# Patient Record
Sex: FEMALE | Race: WHITE | Employment: FULL TIME | URBAN - METROPOLITAN AREA
[De-identification: names, ages, dates, MRNs, and addresses within clinical notes are randomized per-mention and may not be internally consistent; named-entity substitution may affect disease eponyms.]

---

## 2019-09-27 ENCOUNTER — APPOINTMENT (OUTPATIENT)
Dept: CT IMAGING | Age: 49
End: 2019-09-27
Payer: OTHER MISCELLANEOUS

## 2019-09-27 ENCOUNTER — APPOINTMENT (OUTPATIENT)
Dept: GENERAL RADIOLOGY | Age: 49
End: 2019-09-27
Payer: OTHER MISCELLANEOUS

## 2019-09-27 ENCOUNTER — HOSPITAL ENCOUNTER (EMERGENCY)
Age: 49
Discharge: HOME | End: 2019-09-27
Payer: OTHER MISCELLANEOUS

## 2019-09-27 VITALS
RESPIRATION RATE: 18 BRPM | WEIGHT: 165 LBS | DIASTOLIC BLOOD PRESSURE: 77 MMHG | OXYGEN SATURATION: 98 % | SYSTOLIC BLOOD PRESSURE: 132 MMHG | TEMPERATURE: 98 F | HEART RATE: 78 BPM
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
## 2019-09-27 DIAGNOSIS — S16.1XXA ACUTE STRAIN OF NECK MUSCLE, INITIAL ENCOUNTER: ICD-10-CM

## 2019-09-27 DIAGNOSIS — M25.511 ACUTE PAIN OF RIGHT SHOULDER: ICD-10-CM

## 2019-09-27 PROCEDURE — 99284 EMERGENCY DEPT VISIT MOD MDM: CPT

## 2019-09-27 PROCEDURE — 72125 CT NECK SPINE W/O DYE: CPT

## 2019-09-27 PROCEDURE — 73030 X-RAY EXAM OF SHOULDER: CPT

## 2019-09-27 RX ORDER — NAPROXEN 500 MG/1
500 TABLET ORAL 2 TIMES DAILY
Qty: 20 TABLET | Refills: 0 | Status: SHIPPED | OUTPATIENT
Start: 2019-09-27 | End: 2020-02-17

## 2019-09-27 RX ORDER — CYCLOBENZAPRINE HCL 10 MG
10 TABLET ORAL 3 TIMES DAILY PRN
Qty: 20 TABLET | Refills: 0 | Status: SHIPPED | OUTPATIENT
Start: 2019-09-27 | End: 2019-10-04

## 2019-09-27 ASSESSMENT — PAIN SCALES - GENERAL: PAINLEVEL_OUTOF10: 8

## 2019-09-27 ASSESSMENT — ENCOUNTER SYMPTOMS: GASTROINTESTINAL NEGATIVE: 1
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
## 2019-12-05 ENCOUNTER — HOSPITAL ENCOUNTER (OUTPATIENT)
Dept: MRI IMAGING | Age: 49
Discharge: HOME | End: 2019-12-05
Payer: OTHER GOVERNMENT

## 2019-12-05 DIAGNOSIS — M62.830 BACK MUSCLE SPASM: ICD-10-CM

## 2019-12-05 DIAGNOSIS — M99.01 CERVICAL (NECK) REGION SOMATIC DYSFUNCTION: ICD-10-CM

## 2019-12-05 DIAGNOSIS — M50.13 CERVICAL DISC DISORDER WITH RADICULOPATHY OF CERVICOTHORACIC REGION: ICD-10-CM

## 2019-12-05 PROCEDURE — 72141 MRI NECK SPINE W/O DYE: CPT

## 2020-01-31 ENCOUNTER — HOSPITAL ENCOUNTER (OUTPATIENT)
Dept: GENERAL RADIOLOGY | Age: 50
Discharge: HOME | End: 2020-01-31
Payer: OTHER GOVERNMENT

## 2020-01-31 ENCOUNTER — HOSPITAL ENCOUNTER (OUTPATIENT)
Age: 50
Discharge: HOME | End: 2020-01-31
Payer: OTHER GOVERNMENT

## 2020-01-31 PROCEDURE — 72052 X-RAY EXAM NECK SPINE 6/>VWS: CPT

## 2020-02-17 ENCOUNTER — HOSPITAL ENCOUNTER (EMERGENCY)
Age: 50
Discharge: HOME | End: 2020-02-17
Payer: OTHER GOVERNMENT

## 2020-02-17 VITALS
TEMPERATURE: 97.2 F | OXYGEN SATURATION: 97 % | RESPIRATION RATE: 18 BRPM | WEIGHT: 166 LBS | HEART RATE: 75 BPM | BODY MASS INDEX: 25.16 KG/M2 | HEIGHT: 68 IN | DIASTOLIC BLOOD PRESSURE: 67 MMHG | SYSTOLIC BLOOD PRESSURE: 119 MMHG

## 2020-02-17 PROCEDURE — 6370000000 HC RX 637 (ALT 250 FOR IP): Performed by: NURSE PRACTITIONER

## 2020-02-17 PROCEDURE — 99282 EMERGENCY DEPT VISIT SF MDM: CPT

## 2020-02-17 RX ORDER — CHLORHEXIDINE GLUCONATE 0.12 MG/ML
15 RINSE ORAL 2 TIMES DAILY
Qty: 420 ML | Refills: 0 | Status: SHIPPED | OUTPATIENT
Start: 2020-02-17 | End: 2020-03-02

## 2020-02-17 RX ORDER — CLINDAMYCIN HYDROCHLORIDE 150 MG/1
450 CAPSULE ORAL 3 TIMES DAILY
Qty: 90 CAPSULE | Refills: 0 | Status: SHIPPED | OUTPATIENT
Start: 2020-02-17 | End: 2020-02-27

## 2020-02-17 RX ORDER — NAPROXEN 500 MG/1
500 TABLET ORAL 2 TIMES DAILY
Qty: 20 TABLET | Refills: 0 | Status: SHIPPED | OUTPATIENT
Start: 2020-02-17 | End: 2020-10-14 | Stop reason: ALTCHOICE

## 2020-02-17 RX ORDER — NAPROXEN 250 MG/1
250 TABLET ORAL ONCE
Status: DISCONTINUED | OUTPATIENT
Start: 2020-02-17 | End: 2020-02-17 | Stop reason: HOSPADM

## 2020-02-17 RX ORDER — CLINDAMYCIN HYDROCHLORIDE 150 MG/1
450 CAPSULE ORAL ONCE
Status: COMPLETED | OUTPATIENT
Start: 2020-02-17 | End: 2020-02-17

## 2020-02-17 RX ORDER — CHLORAL HYDRATE 500 MG
3000 CAPSULE ORAL 3 TIMES DAILY
COMMUNITY
End: 2022-06-09

## 2020-02-17 RX ADMIN — CLINDAMYCIN HYDROCHLORIDE 300 MG: 150 CAPSULE ORAL at 15:01

## 2020-02-17 ASSESSMENT — ENCOUNTER SYMPTOMS
RHINORRHEA: 0
COLOR CHANGE: 0
SINUS PRESSURE: 1
SINUS PAIN: 1
ABDOMINAL PAIN: 0
SORE THROAT: 0
SHORTNESS OF BREATH: 0

## 2020-02-17 ASSESSMENT — PAIN SCALES - GENERAL: PAINLEVEL_OUTOF10: 7

## 2020-02-17 ASSESSMENT — PAIN DESCRIPTION - PAIN TYPE: TYPE: ACUTE PAIN

## 2020-02-17 NOTE — ED PROVIDER NOTES
Evaluated by Advanced Practice Provider          [facility] ED  EMERGENCY DEPARTMENT ENCOUNTER        Pt Name: Noah Smith  MRN: [id number]  [place] 1970  Dateof evaluation: 2/17/2020  Provider: JEISON Gutierrez - CHANTELL  PCP: Aminta You MD  ED Attending: No att. providers found    [facility]       Chief Complaint   Patient presents with    Dental Pain     pt sts she has a tooth that is loose. sts she thinks she has an infection either from the tooth or sinus. HISTORY OF PRESENTILLNESS   (Location/Symptom, Timing/Onset, Context/Setting, Quality, Duration, Modifying Factors, Severity)  Note limiting factors. Noah Smith is a 51 y.o. female for facial pressure and tooth pain. Onset was a few days. Duration has been since the onset. Context includes pt states she has facial pressure and tooth pain. Alleviating factors include nothing. Aggravating factors include nothing. Pain is 7/10. nothing has been used for pain today. Nursing Notes were all reviewed and agreed with or any disagreements were addressed  in the HPI. REVIEW OF SYSTEMS    (2-9 systems for level 4, 10 or more for level 5)     Review of Systems   Constitutional: Negative for fever. HENT: Positive for dental problem, sinus pressure and sinus pain. Negative for congestion, rhinorrhea and sore throat. Respiratory: Negative for shortness of breath. Cardiovascular: Negative for chest pain. Gastrointestinal: Negative for abdominal pain. Genitourinary: Negative for decreased urine volume and difficulty urinating. Musculoskeletal: Negative for arthralgias and myalgias. Skin: Negative for color change and rash. Neurological: Negative for dizziness and light-headedness. Psychiatric/Behavioral: Negative for agitation. All other systems reviewed and are negative. Positives and Pertinent negatives as per HPI.   Except as noted above in the ROS, all other systems were reviewed and negative. PAST MEDICAL HISTORY     Past Medical History:   Diagnosis Date    COPD (chronic obstructive pulmonary disease) ([facility] [address]          SURGICAL HISTORY       Past Surgical History:   Procedure Laterality Date    TUBAL LIGATION           CURRENT MEDICATIONS       Previous Medications    OMEGA-3 FATTY ACIDS (FISH OIL) 1000 MG CAPS    Take 3,000 mg by mouth 3 times daily         ALLERGIES     Levaquin [levofloxacin]; Oxycodone; Pcn [penicillins]; and Vicodin [hydrocodone-acetaminophen]    FAMILY HISTORY     History reviewed. No pertinent family history.        SOCIAL HISTORY       Social History     Socioeconomic History    Marital status:      Spouse name: None    Number of children: None    Years of education: None    Highest education level: None   Occupational History    None   Social Needs    Financial resource strain: None    Food insecurity:     Worry: None     Inability: None    Transportation needs:     Medical: None     Non-medical: None   Tobacco Use    Smoking status: Current Every Day Smoker   Substance and Sexual Activity    Alcohol use: None    Drug use: Not Currently    Sexual activity: None   Lifestyle    Physical activity:     Days per week: None     Minutes per session: None    Stress: None   Relationships    Social connections:     Talks on phone: None     Gets together: None     Attends Yarsani service: None     Active member of club or organization: None     Attends meetings of clubs or organizations: None     Relationship status: None    Intimate partner violence:     Fear of current or ex partner: None     Emotionally abused: None     Physically abused: None     Forced sexual activity: None   Other Topics Concern    None   Social History Narrative    None       SCREENINGS             PHYSICAL EXAM  (up to 7 for level 4, 8 or more for level 5)     ED Triage Vitals [02/17/20 1425]   BP Temp Temp Source Pulse Resp SpO2 Height Weight   119/67 97.2 °F Patient here for complaints of dental pain and sinus pressure. Patient is not sure which one is aggravating the other however she does exhibit sinus pressure that she has had for the last 3 to 4 days. She also complains of a loose tooth that is become painful. Patient reports that she has not been taking any medications for. She does smoke. On exam she is awake and alert hemodynamically stable nontoxic in appearance. Patient has no concerns for an abscess or Nicholas's. She will be treated with clindamycin due to her allergies for her tooth. She was given instructions to follow-up with her primary care doctor in the next few days and return to the ED for worsening symptoms. She was discharged home with clindamycin and Naprosyn and Peridex. She was provided with a dental referral.  Patient was on ultimately discharged home with all questions answered. ED nurse attempted to medicate the patient with 450 mg of clindamycin however the patient would only take 300 mg. Patient was provided with the appropriate prescriptions and given the appropriate discharge instructions. The patient tolerated their visit well. I have evaluated thispatient. My supervising physician was available for consultation. The patient and / or the family were informed of the results of any tests, a time was given to answer questions, a plan was proposed and they agreed Eyal Saas. FINAL IMPRESSION      1. Pain, dental    2. Acute non-recurrent frontal sinusitis    3.  Smoker          DISPOSITION/PLAN   DISPOSITION Discharge - Pending Orders Complete 02/17/2020 02:56:29 PM      PATIENT REFERRED TO:  Oswald Rodrigez MD  [address], [facility]  [phone]    Schedule an appointment as soon as possible for a visit in 2 days  for re-evaluation    [facility]  [address] [facility]  [phone]    If symptoms worsen    see the list      to establish dental

## 2020-02-21 ENCOUNTER — OFFICE VISIT (OUTPATIENT)
Dept: INTERNAL MEDICINE CLINIC | Age: 50
End: 2020-02-21

## 2020-02-21 VITALS
DIASTOLIC BLOOD PRESSURE: 96 MMHG | WEIGHT: 176 LBS | OXYGEN SATURATION: 96 % | TEMPERATURE: 98.2 F | SYSTOLIC BLOOD PRESSURE: 134 MMHG | RESPIRATION RATE: 18 BRPM | HEIGHT: 68 IN | HEART RATE: 90 BPM | BODY MASS INDEX: 26.67 KG/M2

## 2020-02-21 PROCEDURE — 99203 OFFICE O/P NEW LOW 30 MIN: CPT | Performed by: PHYSICIAN ASSISTANT

## 2020-02-21 RX ORDER — AZITHROMYCIN 250 MG/1
250 TABLET, FILM COATED ORAL SEE ADMIN INSTRUCTIONS
Qty: 6 TABLET | Refills: 0 | Status: SHIPPED | OUTPATIENT
Start: 2020-02-21 | End: 2020-02-26

## 2020-02-21 NOTE — PATIENT INSTRUCTIONS
- Try taking the allergy medications to help with your symptoms   - Call for any worsening symptoms   - Follow up with a dentist

## 2020-02-21 NOTE — PROGRESS NOTES
[hydrocodone-acetaminophen]; Zyrtec [cetirizine]; and Keflex [cephalexin]      Vitals  BP (!) 134/96 (Site: Right Upper Arm, Position: Sitting)   Pulse 90   Temp 98.2 °F (36.8 °C)   Resp 18   Ht 5' 8\" (1.727 m)   Wt 176 lb (79.8 kg)   SpO2 96%   BMI 26.76 kg/m²     Current Medications  Current Outpatient Medications   Medication Sig Dispense Refill    Omega-3 Fatty Acids (FISH OIL) 1000 MG CAPS Take 3,000 mg by mouth 3 times daily      naproxen (NAPROSYN) 500 MG tablet Take 1 tablet by mouth 2 times daily for 20 doses 20 tablet 0    chlorhexidine (PERIDEX) 0.12 % solution Take 15 mLs by mouth 2 times daily for 14 days 420 mL 0    clindamycin (CLEOCIN) 150 MG capsule Take 3 capsules by mouth 3 times daily for 10 days (Patient not taking: Reported on 2/21/2020) 90 capsule 0     No current facility-administered medications for this visit.         Past Medical History  Past Medical History:   Diagnosis Date    COPD (chronic obstructive pulmonary disease) ([facility] [address]        Social History  Social History     Socioeconomic History    Marital status:      Spouse name: Not on file    Number of children: Not on file    Years of education: Not on file    Highest education level: Not on file   Occupational History    Not on file   Social Needs    Financial resource strain: Not on file    Food insecurity:     Worry: Not on file     Inability: Not on file    Transportation needs:     Medical: Not on file     Non-medical: Not on file   Tobacco Use    Smoking status: Current Every Day Smoker   Substance and Sexual Activity    Alcohol use: Not on file    Drug use: Not Currently    Sexual activity: Not on file   Lifestyle    Physical activity:     Days per week: Not on file     Minutes per session: Not on file    Stress: Not on file   Relationships    Social connections:     Talks on phone: Not on file     Gets together: Not on file     Attends Protestant service: Not on file     Active member of club or Cervical: No cervical adenopathy. Skin:     General: Skin is warm and dry. Capillary Refill: Capillary refill takes less than 2 seconds. Neurological:      General: No focal deficit present. Mental Status: She is alert and oriented to person, place, and time. Psychiatric:         Mood and Affect: Mood normal.         Thought Content: Thought content normal.       Assessment/Plan    1. Acute non-recurrent maxillary sinusitis  -Patient was started on Cipro in the ED to cover for oral amee given the loose tooth but had an allergic reaction  -Still having sinus pressure and pain but has multiple allergies, will start Zithromax as needed however patient wished to trial some nasal decongestants prior to starting the antibiotics which I believe is an appropriate choice  -There is no significant periodontic infection at this time, patient will continue Peridex swish and spit and follow-up with a dental clinic  - azithromycin (ZITHROMAX) 250 MG tablet; Take 1 tablet by mouth See Admin Instructions for 5 days 500mg on day 1 followed by 250mg on days 2 - 5  Dispense: 6 tablet; Refill: 0      This was the patient's initial visit to the clinic however given a short appointment time and acute issues, we are unable to go over all the typical new patient information and history. Recommend the patient come back in about a month's time to establish with 1 of the attending physicians in the clinic where they can discuss further detail regarding the patient's prior medical history, home medications, prior preventative health maintenance screens etc.    Return in about 1 month (around 3/21/2020).

## 2020-02-21 NOTE — PROGRESS NOTES
mouth 2 times daily for 14 days 420 mL 0    clindamycin (CLEOCIN) 150 MG capsule Take 3 capsules by mouth 3 times daily for 10 days (Patient not taking: Reported on 2/21/2020) 90 capsule 0     No current facility-administered medications for this visit. Past Medical History  Past Medical History:   Diagnosis Date    COPD (chronic obstructive pulmonary disease) ([facility] [address]        Social History  Social History     Socioeconomic History    Marital status:      Spouse name: Not on file    Number of children: Not on file    Years of education: Not on file    Highest education level: Not on file   Occupational History    Not on file   Social Needs    Financial resource strain: Not on file    Food insecurity:     Worry: Not on file     Inability: Not on file    Transportation needs:     Medical: Not on file     Non-medical: Not on file   Tobacco Use    Smoking status: Current Every Day Smoker   Substance and Sexual Activity    Alcohol use: Not on file    Drug use: Not Currently    Sexual activity: Not on file   Lifestyle    Physical activity:     Days per week: Not on file     Minutes per session: Not on file    Stress: Not on file   Relationships    Social connections:     Talks on phone: Not on file     Gets together: Not on file     Attends Anabaptist service: Not on file     Active member of club or organization: Not on file     Attends meetings of clubs or organizations: Not on file     Relationship status: Not on file    Intimate partner violence:     Fear of current or ex partner: Not on file     Emotionally abused: Not on file     Physically abused: Not on file     Forced sexual activity: Not on file   Other Topics Concern    Not on file   Social History Narrative    Not on file       SurgicalHistory  Past Surgical History:   Procedure Laterality Date    TUBAL LIGATION         Physical Exam  Physical Exam  Constitutional:       Appearance: Normal appearance.    HENT:      Head: Normocephalic and atraumatic. Right Ear: Tympanic membrane, ear canal and external ear normal. There is no impacted cerumen. Left Ear: Tympanic membrane, ear canal and external ear normal. There is no impacted cerumen. Nose: Congestion present. Mouth/Throat:      Mouth: Mucous membranes are moist.   Eyes:      Conjunctiva/sclera: Conjunctivae normal.   Neck:      Musculoskeletal: Normal range of motion. Cardiovascular:      Rate and Rhythm: Normal rate and regular rhythm. Pulses: Normal pulses. Pulmonary:      Effort: Pulmonary effort is normal.      Breath sounds: Normal breath sounds. No wheezing. Chest:      Chest wall: No tenderness. Abdominal:      General: Bowel sounds are normal.      Palpations: Abdomen is soft. Musculoskeletal: Normal range of motion. General: No swelling. Lymphadenopathy:      Cervical: No cervical adenopathy. Skin:     General: Skin is warm and dry. Capillary Refill: Capillary refill takes less than 2 seconds. Neurological:      General: No focal deficit present. Mental Status: She is alert and oriented to person, place, and time. Psychiatric:         Mood and Affect: Mood normal.         Thought Content: Thought content normal.           Assessment    1. Acute non-recurrent maxillary sinusitis  ***  - azithromycin (ZITHROMAX) 250 MG tablet; Take 1 tablet by mouth See Admin Instructions for 5 days 500mg on day 1 followed by 250mg on days 2 - 5  Dispense: 6 tablet; Refill: 0        Plan    No orders of the defined types were placed in this encounter. Return in about 1 month (around 3/21/2020).

## 2020-02-27 ASSESSMENT — ENCOUNTER SYMPTOMS
RHINORRHEA: 0
SHORTNESS OF BREATH: 0
VOICE CHANGE: 1
COUGH: 0
GASTROINTESTINAL NEGATIVE: 1
SORE THROAT: 0
SINUS PRESSURE: 1
SINUS PAIN: 0

## 2020-07-17 ENCOUNTER — OFFICE VISIT (OUTPATIENT)
Dept: INTERNAL MEDICINE CLINIC | Age: 50
End: 2020-07-17

## 2020-07-17 VITALS
DIASTOLIC BLOOD PRESSURE: 80 MMHG | HEIGHT: 68 IN | BODY MASS INDEX: 26.98 KG/M2 | RESPIRATION RATE: 12 BRPM | SYSTOLIC BLOOD PRESSURE: 128 MMHG | WEIGHT: 178 LBS | HEART RATE: 70 BPM

## 2020-07-17 PROBLEM — J44.9 CHRONIC OBSTRUCTIVE PULMONARY DISEASE (HCC): Status: ACTIVE | Noted: 2020-07-17

## 2020-07-17 PROCEDURE — 99203 OFFICE O/P NEW LOW 30 MIN: CPT | Performed by: INTERNAL MEDICINE

## 2020-07-17 RX ORDER — ALBUTEROL SULFATE 2.5 MG/3ML
2.5 SOLUTION RESPIRATORY (INHALATION) EVERY 6 HOURS PRN
COMMUNITY
End: 2020-07-17 | Stop reason: SDUPTHER

## 2020-07-17 RX ORDER — ALBUTEROL SULFATE 2.5 MG/3ML
2.5 SOLUTION RESPIRATORY (INHALATION) EVERY 6 HOURS PRN
Qty: 120 EACH | Refills: 2 | Status: SHIPPED | OUTPATIENT
Start: 2020-07-17

## 2020-07-17 ASSESSMENT — ENCOUNTER SYMPTOMS
SHORTNESS OF BREATH: 0
WHEEZING: 0
VOMITING: 0
NAUSEA: 0
ABDOMINAL PAIN: 0
RHINORRHEA: 0

## 2020-07-17 NOTE — PROGRESS NOTES
Subjective:      Patient ID: Dane Lee is a 53 y.o. female. HPI     Patient is here for follow up. She has a history of COPD. She is a smoker. She was diagnosed two years ago. She has never had PFT done. She has chronic hoarseness since she was a teenager. She denies any chest pain. She denies any GI issues. Review of Systems   Constitutional: Negative for appetite change and fatigue. HENT: Negative for postnasal drip and rhinorrhea. Respiratory: Negative for shortness of breath and wheezing. Cardiovascular: Negative for chest pain and palpitations. Gastrointestinal: Negative for abdominal pain, nausea and vomiting. Musculoskeletal: Negative for joint swelling. Skin: Negative for rash. Neurological: Negative for light-headedness. Psychiatric/Behavioral: Negative for sleep disturbance. Past Medical History:   Diagnosis Date    COPD (chronic obstructive pulmonary disease) ([facility] [address]        Past Surgical History:   Procedure Laterality Date    TUBAL LIGATION         History reviewed. No pertinent family history. Social History     Tobacco Use    Smoking status: Current Every Day Smoker     Packs/day: 0.50    Smokeless tobacco: Never Used   Substance Use Topics    Alcohol use: Yes     Comment: rare    Drug use: Not Currently       /80 (Site: Right Upper Arm, Position: Sitting, Cuff Size: Medium Adult)   Pulse 70   Resp 12   Ht 5' 8\" (1.727 m)   Wt 178 lb (80.7 kg)   BMI 27.06 kg/m²       Objective:   Physical Exam  Constitutional:       Appearance: She is well-developed. HENT:      Head: Normocephalic. Eyes:      Conjunctiva/sclera: Conjunctivae normal.      Pupils: Pupils are equal, round, and reactive to light. Neck:      Musculoskeletal: Normal range of motion and neck supple. Thyroid: No thyroid mass or thyromegaly. Vascular: No carotid bruit or JVD.       Trachea: Trachea normal.      Comments: ?soft tissue mass right upper neck  Cardiovascular: Rate and Rhythm: Normal rate and regular rhythm. Heart sounds: Normal heart sounds. No murmur. No gallop. Pulmonary:      Effort: Pulmonary effort is normal. No respiratory distress. Breath sounds: Normal breath sounds. No wheezing or rales. Chest:      Comments: Mass left lower chest/upper abdomen  Abdominal:      General: Bowel sounds are normal. There is no distension. Palpations: Abdomen is soft. There is no hepatomegaly, splenomegaly or mass. Tenderness: There is no abdominal tenderness. Musculoskeletal: Normal range of motion. Lymphadenopathy:      Cervical: No cervical adenopathy. Skin:     General: Skin is warm and dry. Findings: No rash. Neurological:      Mental Status: She is alert and oriented to person, place, and time. Cranial Nerves: No cranial nerve deficit. Deep Tendon Reflexes: Reflexes are normal and symmetric. Psychiatric:         Behavior: Behavior normal.         Thought Content: Thought content normal.         Judgment: Judgment normal.         Assessment:       Diagnosis Orders   1. Chronic obstructive pulmonary disease, unspecified COPD type ([address]  Full PFT Study With Bronchodilator    Comprehensive Metabolic Panel    CBC Auto Differential    Lipid Panel    TSH without Reflex    Uric Acid   2. Mass of right side of neck     3. Mass of soft tissue of abdomen  US SOFT TISSUE LIMITED AREA          Plan:      #  COPD. Check PFT. #  ? Mass right upper neck. She has had it for many years. She has had a CT neck. She will get us a copy. #  Soft tissue mass. Check US.         Lauren Zafar MD

## 2020-07-31 DIAGNOSIS — J44.9 CHRONIC OBSTRUCTIVE PULMONARY DISEASE, UNSPECIFIED COPD TYPE (HCC): ICD-10-CM

## 2020-07-31 LAB
A/G RATIO: 1.3 (ref 1.1–2.2)
ALBUMIN SERPL-MCNC: 4 G/DL (ref 3.4–5)
ALP BLD-CCNC: 85 U/L (ref 40–129)
ALT SERPL-CCNC: 10 U/L (ref 10–40)
ANION GAP SERPL CALCULATED.3IONS-SCNC: 11 MMOL/L (ref 3–16)
AST SERPL-CCNC: 17 U/L (ref 15–37)
BASOPHILS ABSOLUTE: 0.1 K/UL (ref 0–0.2)
BASOPHILS RELATIVE PERCENT: 0.9 %
BILIRUB SERPL-MCNC: 0.3 MG/DL (ref 0–1)
BUN BLDV-MCNC: 11 MG/DL (ref 7–20)
CALCIUM SERPL-MCNC: 9.1 MG/DL (ref 8.3–10.6)
CHLORIDE BLD-SCNC: 102 MMOL/L (ref 99–110)
CHOLESTEROL, TOTAL: 197 MG/DL (ref 0–199)
CO2: 26 MMOL/L (ref 21–32)
CREAT SERPL-MCNC: 0.6 MG/DL (ref 0.6–1.1)
EOSINOPHILS ABSOLUTE: 0.2 K/UL (ref 0–0.6)
EOSINOPHILS RELATIVE PERCENT: 3.5 %
GFR AFRICAN AMERICAN: >60
GFR NON-AFRICAN AMERICAN: >60
GLOBULIN: 3.1 G/DL
GLUCOSE BLD-MCNC: 95 MG/DL (ref 70–99)
HCT VFR BLD CALC: 42.6 % (ref 36–48)
HDLC SERPL-MCNC: 41 MG/DL (ref 40–60)
HEMOGLOBIN: 14.4 G/DL (ref 12–16)
LDL CHOLESTEROL CALCULATED: 139 MG/DL
LYMPHOCYTES ABSOLUTE: 2 K/UL (ref 1–5.1)
LYMPHOCYTES RELATIVE PERCENT: 29.6 %
MCH RBC QN AUTO: 31.8 PG (ref 26–34)
MCHC RBC AUTO-ENTMCNC: 33.9 G/DL (ref 31–36)
MCV RBC AUTO: 93.8 FL (ref 80–100)
MONOCYTES ABSOLUTE: 0.6 K/UL (ref 0–1.3)
MONOCYTES RELATIVE PERCENT: 8.2 %
NEUTROPHILS ABSOLUTE: 3.9 K/UL (ref 1.7–7.7)
NEUTROPHILS RELATIVE PERCENT: 57.8 %
PDW BLD-RTO: 13 % (ref 12.4–15.4)
PLATELET # BLD: 181 K/UL (ref 135–450)
PMV BLD AUTO: 9.3 FL (ref 5–10.5)
POTASSIUM SERPL-SCNC: 4.4 MMOL/L (ref 3.5–5.1)
RBC # BLD: 4.54 M/UL (ref 4–5.2)
SODIUM BLD-SCNC: 139 MMOL/L (ref 136–145)
TOTAL PROTEIN: 7.1 G/DL (ref 6.4–8.2)
TRIGL SERPL-MCNC: 86 MG/DL (ref 0–150)
TSH SERPL DL<=0.05 MIU/L-ACNC: 3.29 UIU/ML (ref 0.27–4.2)
URIC ACID, SERUM: 3 MG/DL (ref 2.6–6)
VLDLC SERPL CALC-MCNC: 17 MG/DL
WBC # BLD: 6.8 K/UL (ref 4–11)

## 2020-08-03 ENCOUNTER — TELEPHONE (OUTPATIENT)
Dept: INTERNAL MEDICINE CLINIC | Age: 50
End: 2020-08-03

## 2020-08-03 NOTE — TELEPHONE ENCOUNTER
----- Message from Anette Mandel MD sent at 8/3/2020  3:57 PM EDT -----  Contact: [phone]  Call her pharmacy and send the right one in  ----- Message -----  From: Autumn Mehta  Sent: 8/3/2020   3:48 PM EDT  To: Anette Mandel MD    Pt stated that the wrong medication was called in for her inhaler. Pt states that she needs albuteral with bromide. Please advise.       [phone]    Pharmacy: [facility]    Future appt-10/14/2020  Past appt-7/31/2020      SE

## 2020-08-04 RX ORDER — IPRATROPIUM BROMIDE AND ALBUTEROL SULFATE 2.5; .5 MG/3ML; MG/3ML
1 SOLUTION RESPIRATORY (INHALATION) EVERY 4 HOURS PRN
Qty: 1620 ML | Refills: 0 | Status: SHIPPED | OUTPATIENT
Start: 2020-08-04 | End: 2020-11-02

## 2020-08-04 NOTE — TELEPHONE ENCOUNTER
----- Message from Josh Lucks sent at 8/3/2020  4:05 PM EDT -----  Contact: [phone]  Would this be atrovent in [facility]?   ----- Message -----  From: Dinah Lorenzo MD  Sent: 8/3/2020   3:57 PM EDT  To: Latoya Robbins    Call her pharmacy and send the right one in  ----- Message -----  From: Magdalena Mi  Sent: 8/3/2020   3:48 PM EDT  To: Dinah Lorenzo MD    Pt stated that the wrong medication was called in for her inhaler. Pt states that she needs albuteral with bromide. Please advise.       [phone]    Pharmacy: [facility]    Future appt-10/14/2020  Past appt-7/31/2020      SE

## 2020-08-25 ENCOUNTER — TELEPHONE (OUTPATIENT)
Dept: INTERNAL MEDICINE CLINIC | Age: 50
End: 2020-08-25

## 2020-10-14 ENCOUNTER — OFFICE VISIT (OUTPATIENT)
Dept: INTERNAL MEDICINE CLINIC | Age: 50
End: 2020-10-14

## 2020-10-14 VITALS
WEIGHT: 176 LBS | TEMPERATURE: 97.9 F | HEIGHT: 68 IN | HEART RATE: 70 BPM | RESPIRATION RATE: 12 BRPM | DIASTOLIC BLOOD PRESSURE: 80 MMHG | BODY MASS INDEX: 26.67 KG/M2 | SYSTOLIC BLOOD PRESSURE: 110 MMHG

## 2020-10-14 PROCEDURE — 99213 OFFICE O/P EST LOW 20 MIN: CPT | Performed by: INTERNAL MEDICINE

## 2020-10-14 RX ORDER — ALBUTEROL SULFATE 90 UG/1
2 AEROSOL, METERED RESPIRATORY (INHALATION) EVERY 6 HOURS PRN
Qty: 1 INHALER | Refills: 3 | Status: SHIPPED | OUTPATIENT
Start: 2020-10-14 | End: 2022-03-21 | Stop reason: SDUPTHER

## 2020-10-14 ASSESSMENT — ENCOUNTER SYMPTOMS
VOMITING: 0
ABDOMINAL PAIN: 0
RHINORRHEA: 0
SHORTNESS OF BREATH: 0
WHEEZING: 0
NAUSEA: 0

## 2020-10-14 ASSESSMENT — PATIENT HEALTH QUESTIONNAIRE - PHQ9
SUM OF ALL RESPONSES TO PHQ QUESTIONS 1-9: 0
SUM OF ALL RESPONSES TO PHQ9 QUESTIONS 1 & 2: 0
1. LITTLE INTEREST OR PLEASURE IN DOING THINGS: 0
SUM OF ALL RESPONSES TO PHQ QUESTIONS 1-9: 0
2. FEELING DOWN, DEPRESSED OR HOPELESS: 0

## 2020-10-14 NOTE — PROGRESS NOTES
Subjective:      Patient ID: Maria Dolores Seth is a 53 y.o. female. HPI     Patient is here for follow up. She has a history of COPD. She is a smoker. She was diagnosed two years ago. She has never had PFT done. They have been ordered but never done. She does have some wheezing. She needs a new neb as her old one is not working anymore. She has chronic hoarseness since she was a teenager. She denies any chest pain. She denies any GI issues. Review of Systems   Constitutional: Negative for appetite change and fatigue. HENT: Negative for postnasal drip and rhinorrhea. Respiratory: Negative for shortness of breath and wheezing. Cardiovascular: Negative for chest pain and palpitations. Gastrointestinal: Negative for abdominal pain, nausea and vomiting. Musculoskeletal: Negative for joint swelling. Skin: Negative for rash. Neurological: Negative for light-headedness. Psychiatric/Behavioral: Negative for sleep disturbance. Past Medical History:   Diagnosis Date    COPD (chronic obstructive pulmonary disease) ([facility] [address]        Past Surgical History:   Procedure Laterality Date    TUBAL LIGATION         History reviewed. No pertinent family history. Social History     Tobacco Use    Smoking status: Current Every Day Smoker     Packs/day: 0.50    Smokeless tobacco: Never Used   Substance Use Topics    Alcohol use: Yes     Comment: rare    Drug use: Not Currently       /80 (Site: Left Upper Arm, Position: Sitting, Cuff Size: Medium Adult)   Pulse 70   Temp 97.9 °F (36.6 °C)   Resp 12   Ht 5' 8\" (1.727 m)   Wt 176 lb (79.8 kg)   BMI 26.76 kg/m²       Objective:   Physical Exam  Constitutional:       Appearance: She is well-developed. HENT:      Head: Normocephalic. Eyes:      Conjunctiva/sclera: Conjunctivae normal.      Pupils: Pupils are equal, round, and reactive to light. Neck:      Musculoskeletal: Normal range of motion and neck supple.       Thyroid: No thyroid mass or thyromegaly. Vascular: No carotid bruit or JVD. Trachea: Trachea normal.      Comments: ?soft tissue mass right upper neck  Cardiovascular:      Rate and Rhythm: Normal rate and regular rhythm. Heart sounds: Normal heart sounds. No murmur. No gallop. Pulmonary:      Effort: Pulmonary effort is normal. No respiratory distress. Breath sounds: Normal breath sounds. No wheezing or rales. Chest:      Comments: Mass left lower chest/upper abdomen  Abdominal:      General: Bowel sounds are normal. There is no distension. Palpations: Abdomen is soft. There is no hepatomegaly, splenomegaly or mass. Tenderness: There is no abdominal tenderness. Musculoskeletal: Normal range of motion. Lymphadenopathy:      Cervical: No cervical adenopathy. Skin:     General: Skin is warm and dry. Findings: No rash. Neurological:      Mental Status: She is alert and oriented to person, place, and time. Cranial Nerves: No cranial nerve deficit. Deep Tendon Reflexes: Reflexes are normal and symmetric. Psychiatric:         Behavior: Behavior normal.         Thought Content: Thought content normal.         Judgment: Judgment normal.         Assessment:       Diagnosis Orders   1. Chronic obstructive pulmonary disease, unspecified COPD type ([address]  DME Order for Nebulizer as OP   2. Mass of right side of neck  CT SOFT TISSUE NECK W CONTRAST          Plan:      #  COPD. She has not done her PFT. #  ? Mass right upper neck. She has had it for many years. She has had a CT neck but has not provided us a copy. I will order one today. #  She will schedule US for the soft tissue mas. [facility]             Lady Gee MD

## 2020-10-26 ENCOUNTER — APPOINTMENT (OUTPATIENT)
Dept: GENERAL RADIOLOGY | Age: 50
End: 2020-10-26
Payer: OTHER GOVERNMENT

## 2020-10-26 ENCOUNTER — HOSPITAL ENCOUNTER (EMERGENCY)
Age: 50
Discharge: HOME | End: 2020-10-26
Payer: OTHER GOVERNMENT

## 2020-10-26 VITALS
SYSTOLIC BLOOD PRESSURE: 143 MMHG | WEIGHT: 175 LBS | HEART RATE: 73 BPM | RESPIRATION RATE: 18 BRPM | TEMPERATURE: 98.3 F | HEIGHT: 68 IN | BODY MASS INDEX: 26.52 KG/M2 | OXYGEN SATURATION: 99 % | DIASTOLIC BLOOD PRESSURE: 75 MMHG

## 2020-10-26 PROCEDURE — 99283 EMERGENCY DEPT VISIT LOW MDM: CPT

## 2020-10-26 PROCEDURE — U0002 COVID-19 LAB TEST NON-CDC: HCPCS

## 2020-10-26 PROCEDURE — 90471 IMMUNIZATION ADMIN: CPT | Performed by: PHYSICIAN ASSISTANT

## 2020-10-26 PROCEDURE — 6360000002 HC RX W HCPCS: Performed by: PHYSICIAN ASSISTANT

## 2020-10-26 PROCEDURE — 90715 TDAP VACCINE 7 YRS/> IM: CPT | Performed by: PHYSICIAN ASSISTANT

## 2020-10-26 PROCEDURE — 6370000000 HC RX 637 (ALT 250 FOR IP): Performed by: PHYSICIAN ASSISTANT

## 2020-10-26 PROCEDURE — 71045 X-RAY EXAM CHEST 1 VIEW: CPT

## 2020-10-26 PROCEDURE — U0003 INFECTIOUS AGENT DETECTION BY NUCLEIC ACID (DNA OR RNA); SEVERE ACUTE RESPIRATORY SYNDROME CORONAVIRUS 2 (SARS-COV-2) (CORONAVIRUS DISEASE [COVID-19]), AMPLIFIED PROBE TECHNIQUE, MAKING USE OF HIGH THROUGHPUT TECHNOLOGIES AS DESCRIBED BY CMS-2020-01-R: HCPCS

## 2020-10-26 RX ORDER — ERYTHROMYCIN 5 MG/G
OINTMENT OPHTHALMIC
Qty: 1 G | Refills: 0 | Status: SHIPPED | OUTPATIENT
Start: 2020-10-26 | End: 2022-06-09

## 2020-10-26 RX ORDER — ERYTHROMYCIN 5 MG/G
OINTMENT OPHTHALMIC ONCE
Status: COMPLETED | OUTPATIENT
Start: 2020-10-26 | End: 2020-10-26

## 2020-10-26 RX ORDER — AZITHROMYCIN 250 MG/1
250 TABLET, FILM COATED ORAL SEE ADMIN INSTRUCTIONS
Qty: 6 TABLET | Refills: 0 | Status: SHIPPED | OUTPATIENT
Start: 2020-10-26 | End: 2020-10-31

## 2020-10-26 RX ORDER — BENOXINATE HCL/FLUORESCEIN SOD 0.4%-0.25%
2 DROPS OPHTHALMIC (EYE) ONCE
Status: DISCONTINUED | OUTPATIENT
Start: 2020-10-26 | End: 2020-10-26 | Stop reason: ALTCHOICE

## 2020-10-26 RX ADMIN — TETANUS TOXOID, REDUCED DIPHTHERIA TOXOID AND ACELLULAR PERTUSSIS VACCINE, ADSORBED 0.5 ML: 5; 2.5; 8; 8; 2.5 SUSPENSION INTRAMUSCULAR at 21:33

## 2020-10-26 RX ADMIN — ERYTHROMYCIN: 5 OINTMENT OPHTHALMIC at 21:33

## 2020-10-26 ASSESSMENT — VISUAL ACUITY
OU: 20/20
OD: 20/20
OS: 20/20

## 2020-10-26 ASSESSMENT — PAIN SCALES - GENERAL: PAINLEVEL_OUTOF10: 4

## 2020-10-27 ENCOUNTER — CARE COORDINATION (OUTPATIENT)
Dept: CARE COORDINATION | Age: 50
End: 2020-10-27

## 2020-10-27 LAB — SARS-COV-2, PCR: NOT DETECTED

## 2020-10-27 ASSESSMENT — ENCOUNTER SYMPTOMS
NAUSEA: 0
RHINORRHEA: 0
EYE REDNESS: 0
SINUS PAIN: 0
ABDOMINAL PAIN: 0
VOMITING: 0
CONSTIPATION: 0
CHEST TIGHTNESS: 0
DIARRHEA: 0
SINUS PRESSURE: 0
EYE DISCHARGE: 0
SHORTNESS OF BREATH: 0
COUGH: 0
SORE THROAT: 0

## 2020-10-27 ASSESSMENT — VISUAL ACUITY: OU: 1

## 2020-10-27 NOTE — CARE COORDINATION
Patient contacted regarding Joelle Torres. Discussed COVID-19 related testing which was pending at this time. Test results were pending. Patient informed of results, if available? Test Results Pending. Care Transition Nurse/ Ambulatory Care Manager contacted the patient by telephone to perform post discharge assessment. Call within 2 business days of discharge: Yes. Verified name and  with patient as identifiers. Provided introduction to self, and explanation of the CTN/ACM role, and reason for call due to risk factors for infection and/or exposure to COVID-19. Symptoms reviewed with patient who verbalized the following symptoms: cough. Due to no new or worsening symptoms encounter was not routed to provider for escalation. Discussed follow-up appointments. If no appointment was previously scheduled, appointment scheduling offered: Yes  [facility] follow up appointment(s):   Future Appointments   Date Time Provider Lucy Sun   2021 11:00 AM MD [facility] None     Non-[facility] follow up appointment(s): n/a    Non-face-to-face services provided:  Pt to reach out to her PCP and her current eye doctor for f/u. Advance Care Planning:   Does patient have an Advance Directive:  reviewed and current. Patient has following risk factors of: COPD. CTN/ACM reviewed discharge instructions, medical action plan and red flags such as increased shortness of breath, increasing fever and signs of decompensation with patient who verbalized understanding. Discussed exposure protocols and quarantine with CDC Guidelines What to do if you are sick with coronavirus disease .  Patient was given an opportunity for questions and concerns. The patient agrees to contact the [facility] exposure line [phone], [facility] department [place] Department [facility] Health: ([phone]) and PCP office for questions related to their healthcare.  CTN/ACM provided contact information for future

## 2020-10-27 NOTE — ED PROVIDER NOTES
Evaluated by Advanced Practice Provider          [facility]  EMERGENCY DEPARTMENT ENCOUNTER      This patient was not seen and evaluated by the attending physician No att. providers found. I have independently evaluated this patient. Pt Name: Lanny Gamble  MRN: [id number]  Pérezgfrandolph 1970  Dateof evaluation: 10/26/2020  Provider: Mar Gurrola PA-C  PCP: Monika Schultz MD  [address]       Chief Complaint   Patient presents with    Eye Pain     right eye pain after being scratched in eye    Other     pt reports hx of COPD and problems with her sinuses and started a new job where she has to wear a mask all the time and is having worsening SOB while wearing the mask. pt reports some exposure to covid 19       HISTORY OF PRESENTILLNESS   (Location/Symptom, Timing/Onset, Context/Setting, Quality, Duration, Modifying Factors, Severity)  Note limiting factors. Lanny Gamble is a 53 y.o. female for evaluation via private vehicle for assessment of an injury to her right eye, occurred prior to coming to the ER today patient advised she was holding a kitten and it scratched her right eye in addition patient would like to be tested for Covid she has a history of COPD and is worried that she has been around people at her work that have Covid indicates 4 out of 10 throbbing aching pain in her right eyes she does wear soft contact lenses occasionally does not sleep in her contact lenses. No history of past eye injury or eye surgery. Denies any change in vision. Nursing Notes were all reviewed and agreed with or any disagreements were addressed  in the HPI. REVIEW OF SYSTEMS    (2-9 systems for level 4, 10 or more for level 5)     Review of Systems   Constitutional: Negative for chills and fever. HENT: Negative. Negative for congestion, rhinorrhea, sinus pressure, sinus pain and sore throat. Eyes: Negative for discharge, redness and visual disturbance.    Respiratory: Negative for cough, chest tightness and shortness of breath. Cardiovascular: Negative for chest pain and palpitations. Gastrointestinal: Negative for abdominal pain, constipation, diarrhea, nausea and vomiting. Genitourinary: Negative for difficulty urinating, dysuria and frequency. Musculoskeletal: Negative. Skin: Negative. Neurological: Negative. Negative for dizziness, weakness, numbness and headaches. Psychiatric/Behavioral: Negative. All other systems reviewed and are negative. Positives and Pertinent negatives as per HPI. Except as noted above in the ROS, all other systems were reviewed and negative. PAST MEDICAL HISTORY     Past Medical History:   Diagnosis Date    COPD (chronic obstructive pulmonary disease) ([facility] [address]          SURGICAL HISTORY       Past Surgical History:   Procedure Laterality Date    TUBAL LIGATION           CURRENT MEDICATIONS       Discharge Medication List as of 10/26/2020 10:07 PM      CONTINUE these medications which have NOT CHANGED    Details   albuterol sulfate HFA (PROAIR HFA) 108 (90 Base) MCG/ACT inhaler Inhale 2 puffs into the lungs every 6 hours as needed for Wheezing, Disp-1 Inhaler,R-3Print      ipratropium-albuterol (DUONEB) 0.5-2.5 (3) MG/3ML SOLN nebulizer solution Inhale 3 mLs into the lungs every 4 hours as needed for Shortness of Breath, Disp-1620 mL,R-0Normal      albuterol (PROVENTIL) (2.5 MG/3ML) 0.083% nebulizer solution Take 3 mLs by nebulization every 6 hours as needed for Wheezing, Disp-120 each,R-2Normal      Omega-3 Fatty Acids (FISH OIL) 1000 MG CAPS Take 3,000 mg by mouth 3 times dailyHistorical Med               ALLERGIES     Ciprofloxacin; Levaquin [levofloxacin]; Montelukast; Oxycodone; Pcn [penicillins]; Prednisone; Vicodin [hydrocodone-acetaminophen]; Zyrtec [cetirizine]; and Keflex [cephalexin]    FAMILY HISTORY     History reviewed. No pertinent family history.        SOCIAL HISTORY       Social History     Socioeconomic History    Marital status:      Spouse name: None    Number of children: None    Years of education: None    Highest education level: None   Occupational History    None   Social Needs    Financial resource strain: None    Food insecurity     Worry: None     Inability: None    Transportation needs     Medical: None     Non-medical: None   Tobacco Use    Smoking status: Current Every Day Smoker     Packs/day: 0.50    Smokeless tobacco: Never Used   Substance and Sexual Activity    Alcohol use: Yes     Comment: rare    Drug use: Not Currently    Sexual activity: Yes   Lifestyle    Physical activity     Days per week: None     Minutes per session: None    Stress: None   Relationships    Social connections     Talks on phone: None     Gets together: None     Attends Yazidism service: None     Active member of club or organization: None     Attends meetings of clubs or organizations: None     Relationship status: None    Intimate partner violence     Fear of current or ex partner: None     Emotionally abused: None     Physically abused: None     Forced sexual activity: None   Other Topics Concern    None   Social History Narrative    None       SCREENINGS     NIH Score       [place] [place] Coma Scale  Eye Opening: Spontaneous  Best Verbal Response: Oriented  Best Motor Response: Obeys commands  [place] Coma Scale Score: 15    [facility]     Heart Score         PHYSICAL EXAM  (up to 7 for level 4, 8 or more for level 5)     ED Triage Vitals [10/26/20 2037]   BP Temp Temp Source Pulse Resp SpO2 Height Weight   (!) 143/75 98.3 °F (36.8 °C) Oral 73 18 99 % 5' 8\" (1.727 m) 175 lb (79.4 kg)       Physical Exam  Vitals signs and nursing note reviewed. Constitutional:       Appearance: She is well-developed. She is not diaphoretic. HENT:      Head: Normocephalic. Nose: Nose normal.      Mouth/Throat:      Pharynx: No oropharyngeal exudate.    Eyes:      General: Lids are normal. Lids are available at the time of this note:    XR CHEST PORTABLE   Final Result   No acute findings           Xr Chest Portable    Result Date: 10/26/2020  EXAMINATION: ONE XRAY VIEW OF THE CHEST 10/26/2020 9:29 pm COMPARISON: None. HISTORY: ORDERING SYSTEM PROVIDED HISTORY: cough TECHNOLOGIST PROVIDED HISTORY: Reason for exam:->cough Reason for Exam: cough FINDINGS: Cardiac silhouette is normal in size. Lungs appear clear. No acute bony abnormality. No acute findings     CONSULTS:  None      EMERGENCYDEPARTMENT COURSE and DIFFERENTIAL DIAGNOSIS/MDM:   Vitals:    Vitals:    10/26/20 2037   BP: (!) 143/75   Pulse: 73   Resp: 18   Temp: 98.3 °F (36.8 °C)   TempSrc: Oral   SpO2: 99%   Weight: 175 lb (79.4 kg)   Height: 5' 8\" (1.727 m)       Patient was given the following medications:  Medications   erythromycin [facility]) ophthalmic ointment ( Ophthalmic Given 10/26/20 2133)   Tetanus-Diphth-Acell Pertussis (BOOSTRIX) injection 0.5 mL (0.5 mLs Intramuscular Given 10/26/20 2133)           Afebrile, stable, patient presents to the ED for evaluation. Seen on my own, per my scope of practice, with attending ED provider available for consultation who agrees with assessment and plan. Nontoxic patient in no acute distress reports frequent COPD exacerbations. Very small corneal abrasion to her right eye. Infrequent contact lens wearer. Was started on antibiotics and advised follow-up with her optometrist need to use erythromycin versus a fluoroquinolone due to patient's allergy list patient request Covid testing patient is diminished and she reports having increased difficulty with shortness of breath while wearing her mask at work. A chest x-ray to rule out concomitant pneumonia Covid testing pending. Patient is encouraged tobacco cessation SPO2 on room air of 99% not hypoxic appropriate for outpatient treatment she is discharged in stable condition. All questions are answered.   Indications for return to the ED are discussed. Patient is advised if any new or worsening symptoms arise they should immediately return to the emergency room. Follow-up with primary care in 1-2 days. The patient tolerated their visit well. I have evaluated this patient. My supervising physician was available for consultation. The patient and / or the family were informed of the results of any tests, a time was given to answer questions, a plan was proposed and they agreed Esperanza Ridley. I estimate there is LOW risk for ACUTE CORONARY SYNDROME, INTRACRANIAL HEMORRHAGE, MALIGNANT DYSRHYTHMIA, MENINGITIS, PNEUMONIA, PULMONARY EMBOLISM, SEPSIS, SUBARACHNOID HEMORRHAGE, SUBDURAL HEMATOMA, STROKE, or URINARY TRACT INFECTION, thus I consider the discharge disposition reasonable. Mirta Courtney and I have discussed the diagnosis and risks, and we agree with discharging home to follow-up with their primary doctor. We also discussed returning to the Emergency Department immediately if new or worsening symptoms occur. We have discussed the symptoms which are most concerning (e.g., changing or worsening pain, weakness, vomiting, fever) that necessitate immediate return. Final Impression    1. Abrasion of right cornea, initial encounter    2. Cough    3. COPD with acute exacerbation ([facility])        Blood pressure (!) 143/75, pulse 73, temperature 98.3 °F (36.8 °C), temperature source Oral, resp. rate 18, height 5' 8\" (1.727 m), weight 175 lb (79.4 kg), SpO2 99 %. FINAL IMPRESSION      1. Abrasion of right cornea, initial encounter    2. Cough    3.  COPD with acute exacerbation [facility])          DISPOSITION/PLAN   DISPOSITION Decision To Discharge 10/26/2020 10:06:41 PM      PATIENT REFERRED TO:  Elkin Starks MD  [address], [address] [facility]          [address] [facility] [address] [facility]    Schedule an appointment as soon as possible for a visit   for a recheck in 1-2 days      DISCHARGE MEDICATIONS:  Discharge Medication List as of 10/26/2020 10:07 PM      START taking these medications    Details   erythromycin (ROMYCIN) 5 MG/GM ophthalmic ointment Apply one centimeter ribbon to lower lid, of both eyes, 3x a day for 7-10 days.   No refills, Disp-1 g,R-0, Print      azithromycin (ZITHROMAX) 250 MG tablet Take 1 tablet by mouth See Admin Instructions for 5 days 500mg on day 1 followed by 250mg on days 2 - 5, Disp-6 tablet,R-0Print             DISCONTINUED MEDICATIONS:  Discharge Medication List as of 10/26/2020 10:07 PM                 (Please note that portions of this note were completed with a voice recognition program.  Efforts were made to edit the dictations but occasionally words are mis-transcribed.)    Mar Gurrola PA-C (electronically signed)          Mar Gurrola PA-C  10/27/20 7313

## 2020-10-30 ENCOUNTER — TELEPHONE (OUTPATIENT)
Dept: INTERNAL MEDICINE CLINIC | Age: 50
End: 2020-10-30

## 2020-10-30 RX ORDER — DOXYCYCLINE HYCLATE 100 MG
100 TABLET ORAL 2 TIMES DAILY
Qty: 14 TABLET | Refills: 0 | Status: SHIPPED | OUTPATIENT
Start: 2020-10-30 | End: 2020-11-06

## 2020-10-30 NOTE — TELEPHONE ENCOUNTER
----- Message from Mejia Martines MD sent at 10/30/2020 11:08 AM EDT -----  Contact: [phone]  Try Doxy-100 twice daily for 7 days  ----- Message -----  From: Den Das  Sent: 10/30/2020  10:19 AM EDT  To: Mejia Martines MD    Pt states she has been taking the zpack with food and it is still causing stomach pain. Please advise.  ----- Message -----  From: eMjia Martines MD  Sent: 10/30/2020  10:15 AM EDT  To: Den Pippa    She is allergic to multiple antibiotics. Have her take the zpak with food. ----- Message -----  From: Rubina Jones  Sent: 10/30/2020  10:04 AM EDT  To: Mejia Martines MD    Pt called stating that she was in the hospital for a sinus infection on 10/26. Pt was given a z-pac and this is her third dose. Pt states that it hurts her stomach. Pt wants to know if she should keep taking it or if she should have a different antibiotic. Please advise.       Pt- [phone]    Pharmacy: [address] [facility], [address] [facility],[address]      SE

## 2020-11-03 ENCOUNTER — CARE COORDINATION (OUTPATIENT)
Dept: CARE COORDINATION | Age: 50
End: 2020-11-03

## 2021-04-06 ENCOUNTER — OFFICE VISIT (OUTPATIENT)
Dept: INTERNAL MEDICINE CLINIC | Age: 51
End: 2021-04-06

## 2021-04-06 VITALS
HEIGHT: 68 IN | BODY MASS INDEX: 26.07 KG/M2 | RESPIRATION RATE: 12 BRPM | TEMPERATURE: 97.7 F | WEIGHT: 172 LBS | SYSTOLIC BLOOD PRESSURE: 120 MMHG | HEART RATE: 70 BPM | DIASTOLIC BLOOD PRESSURE: 80 MMHG

## 2021-04-06 DIAGNOSIS — J44.9 CHRONIC OBSTRUCTIVE PULMONARY DISEASE, UNSPECIFIED COPD TYPE (HCC): ICD-10-CM

## 2021-04-06 DIAGNOSIS — Z00.00 ROUTINE GENERAL MEDICAL EXAMINATION AT A HEALTH CARE FACILITY: Primary | ICD-10-CM

## 2021-04-06 DIAGNOSIS — R22.1 NECK MASS: ICD-10-CM

## 2021-04-06 DIAGNOSIS — M79.89 SOFT TISSUE MASS: ICD-10-CM

## 2021-04-06 DIAGNOSIS — Z12.31 ENCOUNTER FOR SCREENING MAMMOGRAM FOR MALIGNANT NEOPLASM OF BREAST: ICD-10-CM

## 2021-04-06 PROCEDURE — 99396 PREV VISIT EST AGE 40-64: CPT | Performed by: INTERNAL MEDICINE

## 2021-04-06 ASSESSMENT — ENCOUNTER SYMPTOMS
WHEEZING: 0
NAUSEA: 0
SHORTNESS OF BREATH: 0
ABDOMINAL PAIN: 0
VOMITING: 0
RHINORRHEA: 0

## 2021-04-06 NOTE — PROGRESS NOTES
2021    Carlos Becker (:  1970) is a 53 y.o. female, here for a preventive medicine evaluation. She has COPD. It is under fair control. No ER visits. She has not been vaccinated for COVID. She has not had her inhalers. She went to [facility] for neck pain. She had a CT neck done. It is a workman's compensation case. She has not had US of her soft tissue mass. Patient Active Problem List   Diagnosis    Chronic obstructive pulmonary disease ([address]       Review of Systems   Constitutional: Negative for appetite change and fatigue. HENT: Negative for postnasal drip and rhinorrhea. Respiratory: Negative for shortness of breath and wheezing. Cardiovascular: Negative for chest pain and palpitations. Gastrointestinal: Negative for abdominal pain, nausea and vomiting. Musculoskeletal: Negative for joint swelling. Skin: Negative for rash. Neurological: Negative for light-headedness. Psychiatric/Behavioral: Negative for sleep disturbance. Prior to Visit Medications    Medication Sig Taking? Authorizing Provider   erythromycin [facility]) 5 MG/GM ophthalmic ointment Apply one centimeter ribbon to lower lid, of both eyes, 3x a day for 7-10 days.   No refills  Fátima Maldonado PA-C   albuterol sulfate HFA (PROAIR HFA) 108 (90 Base) MCG/ACT inhaler Inhale 2 puffs into the lungs every 6 hours as needed for Wheezing  Herson Marin MD   ipratropium-albuterol (DUONEB) 0.5-2.5 (3) MG/3ML SOLN nebulizer solution Inhale 3 mLs into the lungs every 4 hours as needed for Shortness of Breath  Ovi Disla MD   albuterol (PROVENTIL) (2.5 MG/3ML) 0.083% nebulizer solution Take 3 mLs by nebulization every 6 hours as needed for Wheezing  Herson Marin MD   Omega-3 Fatty Acids (FISH OIL) 1000 MG CAPS Take 3,000 mg by mouth 3 times daily  Historical Provider, MD        Allergies   Allergen Reactions    Ciprofloxacin     Levaquin [Levofloxacin]     Montelukast Problems Brother        ADVANCE DIRECTIVE: N, <no information>    Vitals:    04/06/21 1327   BP: 120/80   Site: Right Upper Arm   Position: Sitting   Cuff Size: Medium Adult   Pulse: 70   Resp: 12   Temp: 97.7 °F (36.5 °C)   Weight: 172 lb (78 kg)   Height: 5' 8\" (1.727 m)     Estimated body mass index is 26.15 kg/m² as calculated from the following:    Height as of this encounter: 5' 8\" (1.727 m). Weight as of this encounter: 172 lb (78 kg). Physical Exam  Constitutional:       Appearance: She is well-developed. HENT:      Head: Normocephalic. Eyes:      Conjunctiva/sclera: Conjunctivae normal.      Pupils: Pupils are equal, round, and reactive to light. Neck:      Musculoskeletal: Normal range of motion and neck supple. Thyroid: No thyroid mass or thyromegaly. Vascular: No carotid bruit or JVD. Trachea: Trachea normal.   Cardiovascular:      Rate and Rhythm: Normal rate and regular rhythm. Heart sounds: Normal heart sounds. No murmur. No gallop. Pulmonary:      Effort: Pulmonary effort is normal. No respiratory distress. Breath sounds: Normal breath sounds. No wheezing or rales. Abdominal:      General: Bowel sounds are normal. There is no distension. Palpations: Abdomen is soft. There is no hepatomegaly, splenomegaly or mass. Tenderness: There is no abdominal tenderness. Musculoskeletal: Normal range of motion. Lymphadenopathy:      Cervical: No cervical adenopathy. Skin:     General: Skin is warm and dry. Findings: No rash. Neurological:      Mental Status: She is alert and oriented to person, place, and time. Cranial Nerves: No cranial nerve deficit. Deep Tendon Reflexes: Reflexes are normal and symmetric. Psychiatric:         Behavior: Behavior normal.         Thought Content: Thought content normal.         Judgment: Judgment normal.              No flowsheet data found.     Lab Results   Component Value Date    CHOL 197 07/31/2020 TRIG 86 07/31/2020    HDL 41 07/31/2020    LDLCALC 139 07/31/2020    GLUCOSE 95 07/31/2020       The 10-year ASCVD risk score (Rudolph Lindo, et al., 2013) is: 4.2%    Values used to calculate the score:      Age: 53 years      Sex: Female      Is Non- : No      Diabetic: No      Tobacco smoker: Yes      Systolic Blood Pressure: 113 mmHg      Is BP treated: No      HDL Cholesterol: 41 mg/dL      Total Cholesterol: 197 mg/dL    Immunization History   Administered Date(s) Administered    Tdap (Boostrix, Adacel) 10/26/2020       Health Maintenance   Topic Date Due    Hepatitis C screen  Never done    HIV screen  Never done    COVID-19 Vaccine (1) Never done    Cervical cancer screen  Never done    Pneumococcal 0-64 years Vaccine (1 of 1 - PPSV23) 10/13/2021 (Originally 7/6/1976)    Flu vaccine (Season Ended) 10/14/2021 (Originally 9/1/2021)    Lipid screen  07/31/2025    DTaP/Tdap/Td vaccine (2 - Td) 10/26/2030    Hepatitis A vaccine  Aged Out    Hepatitis B vaccine  Aged Out    Hib vaccine  Aged Out    Meningococcal (ACWY) vaccine  Aged Out       ASSESSMENT/PLAN:  1. Routine general medical examination at a health care facility  -     Comprehensive Metabolic Panel; Future  -     CBC Auto Differential; Future  -     Lipid Panel; Future  -     TSH without Reflex; Future  -     Uric Acid; Future  2. Chronic obstructive pulmonary disease, unspecified COPD type ([facility] [address]  3. Neck mass  4. Soft tissue mass  5. Encounter for screening mammogram for malignant neoplasm of breast  -     LEILA DIGITAL SCREEN W OR WO CAD BILATERAL; Future    #  Annual exam done. # COPD stable. #Check CT neck and US of soft tissue mass. No follow-ups on file. An electronic signature was used to authenticate this note.     --Claude Costa MD on 4/6/2021 at 1:50 PM

## 2021-04-07 ENCOUNTER — TELEPHONE (OUTPATIENT)
Dept: INTERNAL MEDICINE CLINIC | Age: 51
End: 2021-04-07

## 2021-04-07 NOTE — TELEPHONE ENCOUNTER
----- Message from Aly Gamboa MD sent at 4/7/2021  3:13 PM EDT -----  Can you let her know  ----- Message -----  From: Jose Lira  Sent: 4/7/2021   3:01 PM EDT  To: Aly Gamboa MD    FYI: [address] does not have any results for a CT scan for pt.

## 2021-04-20 DIAGNOSIS — Z00.00 ROUTINE GENERAL MEDICAL EXAMINATION AT A HEALTH CARE FACILITY: ICD-10-CM

## 2021-04-20 LAB
A/G RATIO: 1.7 (ref 1.1–2.2)
ALBUMIN SERPL-MCNC: 4.5 G/DL (ref 3.4–5)
ALP BLD-CCNC: 80 U/L (ref 40–129)
ALT SERPL-CCNC: 14 U/L (ref 10–40)
ANION GAP SERPL CALCULATED.3IONS-SCNC: 11 MMOL/L (ref 3–16)
AST SERPL-CCNC: 15 U/L (ref 15–37)
BASOPHILS ABSOLUTE: 0.1 K/UL (ref 0–0.2)
BASOPHILS RELATIVE PERCENT: 0.9 %
BILIRUB SERPL-MCNC: 0.3 MG/DL (ref 0–1)
BUN BLDV-MCNC: 11 MG/DL (ref 7–20)
CALCIUM SERPL-MCNC: 9 MG/DL (ref 8.3–10.6)
CHLORIDE BLD-SCNC: 101 MMOL/L (ref 99–110)
CHOLESTEROL, TOTAL: 238 MG/DL (ref 0–199)
CO2: 27 MMOL/L (ref 21–32)
CREAT SERPL-MCNC: 0.6 MG/DL (ref 0.6–1.1)
EOSINOPHILS ABSOLUTE: 0.2 K/UL (ref 0–0.6)
EOSINOPHILS RELATIVE PERCENT: 3.8 %
GFR AFRICAN AMERICAN: >60
GFR NON-AFRICAN AMERICAN: >60
GLOBULIN: 2.6 G/DL
GLUCOSE BLD-MCNC: 79 MG/DL (ref 70–99)
HCT VFR BLD CALC: 44.1 % (ref 36–48)
HDLC SERPL-MCNC: 44 MG/DL (ref 40–60)
HEMOGLOBIN: 15 G/DL (ref 12–16)
LDL CHOLESTEROL CALCULATED: 171 MG/DL
LYMPHOCYTES ABSOLUTE: 1.9 K/UL (ref 1–5.1)
LYMPHOCYTES RELATIVE PERCENT: 31.5 %
MCH RBC QN AUTO: 32.4 PG (ref 26–34)
MCHC RBC AUTO-ENTMCNC: 34.1 G/DL (ref 31–36)
MCV RBC AUTO: 95 FL (ref 80–100)
MONOCYTES ABSOLUTE: 0.5 K/UL (ref 0–1.3)
MONOCYTES RELATIVE PERCENT: 8.5 %
NEUTROPHILS ABSOLUTE: 3.4 K/UL (ref 1.7–7.7)
NEUTROPHILS RELATIVE PERCENT: 55.3 %
PDW BLD-RTO: 13.1 % (ref 12.4–15.4)
PLATELET # BLD: 180 K/UL (ref 135–450)
PMV BLD AUTO: 9 FL (ref 5–10.5)
POTASSIUM SERPL-SCNC: 4 MMOL/L (ref 3.5–5.1)
RBC # BLD: 4.65 M/UL (ref 4–5.2)
SODIUM BLD-SCNC: 139 MMOL/L (ref 136–145)
TOTAL PROTEIN: 7.1 G/DL (ref 6.4–8.2)
TRIGL SERPL-MCNC: 115 MG/DL (ref 0–150)
TSH SERPL DL<=0.05 MIU/L-ACNC: 2.33 UIU/ML (ref 0.27–4.2)
URIC ACID, SERUM: 2.9 MG/DL (ref 2.6–6)
VLDLC SERPL CALC-MCNC: 23 MG/DL
WBC # BLD: 6.1 K/UL (ref 4–11)

## 2021-04-23 ENCOUNTER — TELEPHONE (OUTPATIENT)
Dept: INTERNAL MEDICINE CLINIC | Age: 51
End: 2021-04-23

## 2021-04-23 DIAGNOSIS — E78.00 HYPERCHOLESTEREMIA: Primary | ICD-10-CM

## 2021-04-23 RX ORDER — ATORVASTATIN CALCIUM 20 MG/1
20 TABLET, FILM COATED ORAL DAILY
Qty: 90 TABLET | Refills: 0 | Status: SHIPPED | OUTPATIENT
Start: 2021-04-23 | End: 2022-06-09

## 2021-04-23 NOTE — TELEPHONE ENCOUNTER
----- Message from Arnie Camacho MD sent at 4/23/2021  1:57 PM EDT -----  Cardiovascular risk score is 8.54. Start Lipitor 20 mg daily. Check liver and lipids in 6 weeks.

## 2021-08-04 ENCOUNTER — APPOINTMENT (OUTPATIENT)
Dept: GENERAL RADIOLOGY | Age: 51
End: 2021-08-04
Payer: OTHER GOVERNMENT

## 2021-08-04 ENCOUNTER — HOSPITAL ENCOUNTER (EMERGENCY)
Age: 51
Discharge: HOME | End: 2021-08-04
Payer: OTHER GOVERNMENT

## 2021-08-04 VITALS
SYSTOLIC BLOOD PRESSURE: 110 MMHG | RESPIRATION RATE: 18 BRPM | HEART RATE: 59 BPM | OXYGEN SATURATION: 99 % | BODY MASS INDEX: 25.46 KG/M2 | HEIGHT: 68 IN | DIASTOLIC BLOOD PRESSURE: 78 MMHG | WEIGHT: 168 LBS | TEMPERATURE: 96.7 F

## 2021-08-04 DIAGNOSIS — S61.211A LACERATION OF LEFT INDEX FINGER WITHOUT FOREIGN BODY WITHOUT DAMAGE TO NAIL, INITIAL ENCOUNTER: Primary | ICD-10-CM

## 2021-08-04 PROCEDURE — 73140 X-RAY EXAM OF FINGER(S): CPT

## 2021-08-04 PROCEDURE — 12002 RPR S/N/AX/GEN/TRNK2.6-7.5CM: CPT

## 2021-08-04 PROCEDURE — 99283 EMERGENCY DEPT VISIT LOW MDM: CPT

## 2021-08-04 NOTE — ED NOTES
5581 wound care completed. Affected hand soaked in sterile normal saline solution and chlorhexadine soap, scrubbed with sterile 4x4s.      Gary Evangelista  08/04/21 7054

## 2021-08-04 NOTE — ED PROVIDER NOTES
[address] ED  EMERGENCY DEPARTMENT ENCOUNTER        Pt Name: Madai Lilly  MRN: [id number]  [place] 1970  Date of evaluation: 8/4/2021  Provider: Kathi Russo PA-C  PCP: Flora Morales MD  Note Started: 3:04 PM EDT       SARAH. I have evaluated this patient. My supervising physician was available for consultation. CHIEF COMPLAINT       Chief Complaint   Patient presents with    Laceration     middle finger left hand       HISTORY OF PRESENT ILLNESS   (Location, Timing/Onset, Context/Setting, Quality, Duration, Modifying Factors, Severity, Associated Signs and Symptoms)  Note limiting factors. Chief Complaint: left middle finger laceration    Madai Lilly is a 49 y.o. female with a past medical history of COPD brought in today by private vehicle with complaints of left middle finger laceration. States that she accidentally cut herself with hedge tremors. She is left-hand dominant. She is up-to-date on her tetanus shot. Onset occurred just prior to arrival to the ED. Duration symptoms have been persistent since onset. Context includes left finger laceration. No aggravating symptoms. No alleviating symptoms. Otherwise denies any other complaints. Denies numbness or tingling. She has not taken anything at this time for pain control. Nothing seems to make symptoms better or worse. She was able stop the bleeding prior to arriving with direct pressure. Nursing Notes were all reviewed and agreed with or any disagreements were addressed in the HPI. REVIEW OF SYSTEMS    (2-9 systems for level 4, 10 or more for level 5)     Review of Systems   Constitutional: Negative. Musculoskeletal: Positive for arthralgias. Skin: Positive for wound. Neurological: Negative. Hematological: Negative. Positives and Pertinent negatives as per HPI. Except as noted above in the ROS, all other systems were reviewed and negative.        PAST MEDICAL HISTORY Past Medical History:   Diagnosis Date    COPD (chronic obstructive pulmonary disease) ([facility] [address]          SURGICAL HISTORY     Past Surgical History:   Procedure Laterality Date    TUBAL LIGATION           CURRENTMEDICATIONS       Current Discharge Medication List      CONTINUE these medications which have NOT CHANGED    Details   atorvastatin (LIPITOR) 20 MG tablet Take 1 tablet by mouth daily  Qty: 90 tablet, Refills: 0      erythromycin (ROMYCIN) 5 MG/GM ophthalmic ointment Apply one centimeter ribbon to lower lid, of both eyes, 3x a day for 7-10 days.   No refills  Qty: 1 g, Refills: 0      albuterol sulfate HFA (PROAIR HFA) 108 (90 Base) MCG/ACT inhaler Inhale 2 puffs into the lungs every 6 hours as needed for Wheezing  Qty: 1 Inhaler, Refills: 3      ipratropium-albuterol (DUONEB) 0.5-2.5 (3) MG/3ML SOLN nebulizer solution Inhale 3 mLs into the lungs every 4 hours as needed for Shortness of Breath  Qty: 1620 mL, Refills: 0      albuterol (PROVENTIL) (2.5 MG/3ML) 0.083% nebulizer solution Take 3 mLs by nebulization every 6 hours as needed for Wheezing  Qty: 120 each, Refills: 2      Omega-3 Fatty Acids (FISH OIL) 1000 MG CAPS Take 3,000 mg by mouth 3 times daily               ALLERGIES     Ciprofloxacin, Levaquin [levofloxacin], Montelukast, Oxycodone, Pcn [penicillins], Prednisone, Vicodin [hydrocodone-acetaminophen], Zyrtec [cetirizine], and Keflex [cephalexin]    FAMILYHISTORY       Family History   Problem Relation Age of Onset    Lung Cancer Mother     COPD Father     Diabetes type 2  Sister     Obesity Sister     Diabetes type 2  Brother     No Known Problems Brother           SOCIAL HISTORY       Social History     Tobacco Use    Smoking status: Current Every Day Smoker     Packs/day: 0.50    Smokeless tobacco: Never Used   Vaping Use    Vaping Use: Never used   Substance Use Topics    Alcohol use: Yes     Comment: rare    Drug use: Not Currently       SCREENINGS             PHYSICAL EXAM (up to 7 for level 4, 8 or more for level 5)     ED Triage Vitals [08/04/21 1301]   BP Temp Temp Source Pulse Resp SpO2 Height Weight   106/63 96.7 °F (35.9 °C) Oral 58 14 100 % 5' 8\" (1.727 m) 168 lb (76.2 kg)       Physical Exam  Vitals and nursing note reviewed. Constitutional:       Appearance: She is well-developed. She is not diaphoretic. HENT:      Head: Normocephalic and atraumatic. Nose: Nose normal.   Eyes:      General:         Right eye: No discharge. Left eye: No discharge. Pulmonary:      Effort: Pulmonary effort is normal. No respiratory distress. Musculoskeletal:         General: No deformity. Normal range of motion. Cervical back: Normal range of motion and neck supple. Skin:     General: Skin is warm and dry. Capillary Refill: Capillary refill takes less than 2 seconds. Findings: Laceration present. Comments: 3 cm laceration noted to the distal left middle finger. No tendon or joint involvement. No foreign body. Neurovascularly intact. Sensation intact in the medial ulnar and radial distribution. Full flexion and extension as well as AB duction and adduction intact and symmetric. Neurological:      Mental Status: She is alert and oriented to person, place, and time. Psychiatric:         Behavior: Behavior normal.         DIAGNOSTIC RESULTS   LABS:    Labs Reviewed - No data to display    When ordered only abnormal lab results are displayed. All other labs were within normal range or not returned as of this dictation. EKG: When ordered, EKG's are interpreted by the Emergency Department Physician in the absence of a cardiologist.  Please see their note for interpretation of EKG.     RADIOLOGY:   Non-plain film images such as CT, Ultrasound and MRI are read by the radiologist. Plain radiographic images are visualized and preliminarily interpreted by the ED Provider with the below findings:        Interpretation per the Radiologist below, if available at the time of this note:    XR FINGER LEFT (MIN 2 VIEWS)   Final Result   Laceration of the distal 3rd digit with no underlying skeletal injury. XR FINGER LEFT (MIN 2 VIEWS)    Result Date: 8/4/2021  EXAMINATION: 3 XRAY VIEWS OF THE LEFT FINGER 8/4/2021 1:17 pm COMPARISON: None. HISTORY: ORDERING SYSTEM PROVIDED HISTORY: injury TECHNOLOGIST PROVIDED HISTORY: Reason for exam:->injury Reason for Exam: cut 3rd distal digit with hedge trimmers today Acuity: Acute Type of Exam: Initial FINDINGS: Laceration of the 3rd digit with no radiopaque foreign body. No underlying fracture. Laceration of the distal 3rd digit with no underlying skeletal injury. PROCEDURES   Unless otherwise noted below, none     Lac Repair    Date/Time: 8/4/2021 3:12 PM  Performed by: Lisa Estevez PA-C  Authorized by: Lisa Estevez PA-C     Consent:     Consent obtained:  Verbal    Consent given by:  Patient    Risks discussed:  Infection, need for additional repair, nerve damage, pain, poor cosmetic result, poor wound healing, retained foreign body, tendon damage and vascular damage    Alternatives discussed:  No treatment, delayed treatment, observation and referral  Anesthesia (see MAR for exact dosages):      Anesthesia method:  Local infiltration    Local anesthetic:  Lidocaine 1% w/o epi  Laceration details:     Location:  Finger    Length (cm):  3  Repair type:     Repair type:  Simple  Pre-procedure details:     Preparation:  Patient was prepped and draped in usual sterile fashion and imaging obtained to evaluate for foreign bodies  Exploration:     Hemostasis achieved with:  Direct pressure    Wound exploration: wound explored through full range of motion and entire depth of wound probed and visualized      Wound extent: no areolar tissue violation noted, no fascia violation noted, no foreign bodies/material noted, no muscle damage noted, no nerve damage noted, no tendon damage noted, no underlying fracture noted and no vascular damage noted      Contaminated: no    Treatment:     Area cleansed with:  Saline    Amount of cleaning:  Standard    Irrigation solution:  Sterile saline    Irrigation volume:  100    Irrigation method:  Pressure wash    Visualized foreign bodies/material removed: no    Skin repair:     Repair method:  Sutures    Suture size:  4-0    Suture material:  Nylon    Suture technique:  Simple interrupted    Number of sutures:  4  Approximation:     Approximation:  Close  Post-procedure details:     Dressing:  Bulky dressing and splint for protection    Patient tolerance of procedure: Tolerated well, no immediate complications        CRITICAL CARE TIME   N/A    CONSULTS:  None      EMERGENCY DEPARTMENT COURSE and DIFFERENTIAL DIAGNOSIS/MDM:   Vitals:    Vitals:    08/04/21 1301   BP: 106/63   Pulse: 58   Resp: 14   Temp: 96.7 °F (35.9 °C)   TempSrc: Oral   SpO2: 100%   Weight: 168 lb (76.2 kg)   Height: 5' 8\" (1.727 m)       Patient was given the following medications:  Medications - No data to display        Patient brought in today with complaints of a left middle finger laceration. On exam patient is alert oriented afebrile breathing on room air satting at 100%. Nontoxic. No acute respiratory distress. Old labs and records reviewed at this time. Patient was seen and evaluated by myself and my attending was available for consultation. Patient cleaned extensively here. She is already up-to-date on her tetanus shot. She did not anything for pain control while here in the ED. Please see procedure note for full details. Patient tolerated procedure well. X-ray reveals a laceration of the distal third digit with no underlying skeletal injury. Given a splint and dressing for comfort for home. She was told to continue with Tylenol or ibuprofen for pain control.   She was told return in 10 days for suture removal.      Patient told return immediately to the ED if she experience any new or worsening symptoms including but not limited to increased pain swelling redness drainage or any new or worsening symptoms. She verbalized understanding of this plan was comfortable and stable at time of discharge. I did feel comfortable sending this patient home with close follow-up instructions and strict return precautions. Patient discharged in stable condition. FINAL IMPRESSION      1.  Laceration of left index finger without foreign body without damage to nail, initial encounter          DISPOSITION/PLAN   DISPOSITION Discharge - Pending Orders Complete 08/04/2021 02:47:50 PM      PATIENT REFERRED TO:  Isai Mckeon MD  [address] , [facility]  [phone]      For suture removal 10-12 days    [facility] ([facility] ED  [address] [facility]  [phone]    For suture removal 10-12 days      DISCHARGE MEDICATIONS:  Current Discharge Medication List          DISCONTINUED MEDICATIONS:  Current Discharge Medication List                 (Please note that portions of this note were completed with a voice recognition program.  Efforts were made to edit the dictations but occasionally words are mis-transcribed.)    Mary Adkins PA-C (electronically signed)            Mary Adkins PA-C  08/04/21 6959

## 2021-08-04 NOTE — ED NOTES
Dc instructions and follow up discussed with pt; pt verbalized understanding; right middle finger splint and dressing applied      Tala Salmon RN  08/04/21 8661

## 2021-08-05 ENCOUNTER — TELEPHONE (OUTPATIENT)
Dept: INTERNAL MEDICINE CLINIC | Age: 51
End: 2021-08-05

## 2021-08-05 NOTE — TELEPHONE ENCOUNTER
----- Message from Yaniv Rodgers MD sent at 8/5/2021  1:04 PM EDT -----  Contact: [phone] (W)  If she is concerned she needs to go back to the ER  ----- Message -----  From: Abhishek Chung  Sent: 8/5/2021   9:12 AM EDT  To: Yaniv Rodgers MD    Pt cut her finger yesterday and was seen in the ED for it and now today she is thinking she may have had a stroke yesterday when the event happened stated she feel lightheaded broke out in cold sweat and collapsed her b.p. when she was in the ED was 106/63 she only has a slight headache today but wanted to make sure she didn't have a stroke yesterday.

## 2021-08-05 NOTE — TELEPHONE ENCOUNTER
----- Message from Rose Pink MD sent at 8/5/2021  1:04 PM EDT -----  Contact: [phone] (W)  If she is concerned she needs to go back to the ER  ----- Message -----  From: Raymundo Moon  Sent: 8/5/2021   9:12 AM EDT  To: Rose Pink MD    Pt cut her finger yesterday and was seen in the ED for it and now today she is thinking she may have had a stroke yesterday when the event happened stated she feel lightheaded broke out in cold sweat and collapsed her b.p. when she was in the ED was 106/63 she only has a slight headache today but wanted to make sure she didn't have a stroke yesterday.

## 2021-08-05 NOTE — TELEPHONE ENCOUNTER
----- Message from Marilou Perez MD sent at 8/5/2021  3:46 PM EDT -----  Contact: [phone] (W)  ER  ----- Message -----  From: Shantanu Matthew  Sent: 8/5/2021   2:41 PM EDT  To: Marilou Perez MD    Pt wanting to know if she should set up with you to get the stitches out or go back to ER for that  ----- Message -----  From: Marilou Perez MD  Sent: 8/5/2021   1:04 PM EDT  To: Shantanu Matthew    If she is concerned she needs to go back to the ER  ----- Message -----  From: Jose Arnold  Sent: 8/5/2021   9:12 AM EDT  To: Marilou Perez MD    Pt cut her finger yesterday and was seen in the ED for it and now today she is thinking she may have had a stroke yesterday when the event happened stated she feel lightheaded broke out in cold sweat and collapsed her b.p. when she was in the ED was 106/63 she only has a slight headache today but wanted to make sure she didn't have a stroke yesterday.

## 2021-11-12 ENCOUNTER — OFFICE VISIT (OUTPATIENT)
Dept: INTERNAL MEDICINE CLINIC | Age: 51
End: 2021-11-12

## 2021-11-12 VITALS
DIASTOLIC BLOOD PRESSURE: 80 MMHG | HEIGHT: 68 IN | HEART RATE: 70 BPM | SYSTOLIC BLOOD PRESSURE: 128 MMHG | WEIGHT: 171 LBS | BODY MASS INDEX: 25.91 KG/M2 | RESPIRATION RATE: 12 BRPM

## 2021-11-12 DIAGNOSIS — E78.5 HYPERLIPIDEMIA, UNSPECIFIED HYPERLIPIDEMIA TYPE: ICD-10-CM

## 2021-11-12 DIAGNOSIS — R22.1 NECK MASS: ICD-10-CM

## 2021-11-12 DIAGNOSIS — M79.89 SOFT TISSUE MASS: ICD-10-CM

## 2021-11-12 DIAGNOSIS — J44.9 CHRONIC OBSTRUCTIVE PULMONARY DISEASE, UNSPECIFIED COPD TYPE (HCC): Primary | ICD-10-CM

## 2021-11-12 DIAGNOSIS — Z12.11 COLON CANCER SCREENING: ICD-10-CM

## 2021-11-12 PROCEDURE — 82274 ASSAY TEST FOR BLOOD FECAL: CPT | Performed by: INTERNAL MEDICINE

## 2021-11-12 PROCEDURE — 99214 OFFICE O/P EST MOD 30 MIN: CPT | Performed by: INTERNAL MEDICINE

## 2021-11-12 ASSESSMENT — ENCOUNTER SYMPTOMS
ABDOMINAL PAIN: 0
VOMITING: 0
WHEEZING: 0
SHORTNESS OF BREATH: 0
NAUSEA: 0
RHINORRHEA: 0

## 2021-11-12 ASSESSMENT — PATIENT HEALTH QUESTIONNAIRE - PHQ9
SUM OF ALL RESPONSES TO PHQ QUESTIONS 1-9: 0
1. LITTLE INTEREST OR PLEASURE IN DOING THINGS: 0
SUM OF ALL RESPONSES TO PHQ QUESTIONS 1-9: 0
2. FEELING DOWN, DEPRESSED OR HOPELESS: 0
SUM OF ALL RESPONSES TO PHQ QUESTIONS 1-9: 0
SUM OF ALL RESPONSES TO PHQ9 QUESTIONS 1 & 2: 0

## 2021-11-12 NOTE — PROGRESS NOTES
reactive to light. Neck:      Thyroid: No thyroid mass or thyromegaly. Vascular: No carotid bruit or JVD. Trachea: Trachea normal.   Cardiovascular:      Rate and Rhythm: Normal rate and regular rhythm. Heart sounds: Normal heart sounds. No murmur heard. No gallop. Pulmonary:      Effort: Pulmonary effort is normal. No respiratory distress. Breath sounds: Normal breath sounds. No wheezing or rales. Chest:      Comments: Mass left lower chest/upper abdomen  Abdominal:      General: Bowel sounds are normal. There is no distension. Palpations: Abdomen is soft. There is no hepatomegaly, splenomegaly or mass. Tenderness: There is no abdominal tenderness. Comments: Soft tissue mass on the left   Musculoskeletal:         General: Normal range of motion. Cervical back: Normal range of motion and neck supple. Lymphadenopathy:      Cervical: No cervical adenopathy. Skin:     General: Skin is warm and dry. Findings: No rash. Neurological:      Mental Status: She is alert and oriented to person, place, and time. Cranial Nerves: No cranial nerve deficit. Deep Tendon Reflexes: Reflexes are normal and symmetric. Psychiatric:         Behavior: Behavior normal.         Thought Content: Thought content normal.         Judgment: Judgment normal.         Assessment:       Diagnosis Orders   1. Chronic obstructive pulmonary disease, unspecified COPD type ([address]     2. Neck mass  US HEAD NECK SOFT TISSUE THYROID   3. Soft tissue mass  US SOFT TISSUE LIMITED AREA   4. Hyperlipidemia, unspecified hyperlipidemia type     5. Colon cancer screening  POCT Fecal Immunochemical Test (FIT)          Plan:      #  COPD. She has not done her PFT. #  She has not had her mammogram.    #  She refused colonoscopy. I ordered a FIT test.     #  US soft tissue mass. #  HLD. Elevated. On diet and herbal stuff.                 Guy Jacinto MD

## 2021-11-23 ENCOUNTER — HOSPITAL ENCOUNTER (OUTPATIENT)
Dept: MAMMOGRAPHY | Age: 51
Discharge: HOME | End: 2021-11-23
Payer: OTHER GOVERNMENT

## 2021-11-23 DIAGNOSIS — Z12.31 ENCOUNTER FOR SCREENING MAMMOGRAM FOR MALIGNANT NEOPLASM OF BREAST: ICD-10-CM

## 2021-11-23 LAB
CONTROL: NORMAL
HEMOCCULT STL QL: NORMAL

## 2021-11-23 PROCEDURE — 77063 BREAST TOMOSYNTHESIS BI: CPT

## 2021-11-24 ENCOUNTER — TELEPHONE (OUTPATIENT)
Dept: MAMMOGRAPHY | Age: 51
End: 2021-11-24

## 2021-12-04 ENCOUNTER — HOSPITAL ENCOUNTER (OUTPATIENT)
Dept: ULTRASOUND IMAGING | Age: 51
Discharge: HOME | End: 2021-12-04
Payer: OTHER GOVERNMENT

## 2021-12-04 DIAGNOSIS — R22.1 NECK MASS: ICD-10-CM

## 2021-12-04 DIAGNOSIS — M79.89 SOFT TISSUE MASS: ICD-10-CM

## 2021-12-04 PROCEDURE — 76536 US EXAM OF HEAD AND NECK: CPT

## 2021-12-04 PROCEDURE — 76999 ECHO EXAMINATION PROCEDURE: CPT

## 2022-03-21 ENCOUNTER — OFFICE VISIT (OUTPATIENT)
Dept: INTERNAL MEDICINE CLINIC | Age: 52
End: 2022-03-21

## 2022-03-21 VITALS — HEIGHT: 68 IN | WEIGHT: 169 LBS | BODY MASS INDEX: 25.61 KG/M2

## 2022-03-21 DIAGNOSIS — J44.9 CHRONIC OBSTRUCTIVE PULMONARY DISEASE, UNSPECIFIED COPD TYPE (HCC): Primary | ICD-10-CM

## 2022-03-21 DIAGNOSIS — E78.5 HYPERLIPIDEMIA, UNSPECIFIED HYPERLIPIDEMIA TYPE: ICD-10-CM

## 2022-03-21 PROCEDURE — 99213 OFFICE O/P EST LOW 20 MIN: CPT | Performed by: INTERNAL MEDICINE

## 2022-03-21 RX ORDER — ALBUTEROL SULFATE 90 UG/1
2 AEROSOL, METERED RESPIRATORY (INHALATION) EVERY 6 HOURS PRN
Qty: 3 EACH | Refills: 0 | Status: SHIPPED | OUTPATIENT
Start: 2022-03-21 | End: 2022-06-09 | Stop reason: SDUPTHER

## 2022-03-21 ASSESSMENT — ENCOUNTER SYMPTOMS
VOMITING: 0
WHEEZING: 0
SHORTNESS OF BREATH: 0
NAUSEA: 0
RHINORRHEA: 0
ABDOMINAL PAIN: 0
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
## 2022-04-13 ENCOUNTER — TELEPHONE (OUTPATIENT)
Dept: INTERNAL MEDICINE CLINIC | Age: 52
End: 2022-04-13

## 2022-04-13 NOTE — TELEPHONE ENCOUNTER
----- Message from Sita Agrawal sent at 4/13/2022  2:21 PM EDT -----  Contact: Isabel[facility] [phone]  Need demographics sheet for patient please.      [facility] Fax: [phone]    Thank you

## 2022-05-31 ENCOUNTER — TELEPHONE (OUTPATIENT)
Dept: INTERNAL MEDICINE CLINIC | Age: 52
End: 2022-05-31

## 2022-05-31 DIAGNOSIS — J06.9 UPPER RESPIRATORY TRACT INFECTION, UNSPECIFIED TYPE: Primary | ICD-10-CM

## 2022-05-31 RX ORDER — AZITHROMYCIN 250 MG/1
250 TABLET, FILM COATED ORAL SEE ADMIN INSTRUCTIONS
Qty: 6 TABLET | Refills: 0 | Status: SHIPPED | OUTPATIENT
Start: 2022-05-31 | End: 2022-06-05

## 2022-05-31 NOTE — TELEPHONE ENCOUNTER
----- Message from Radha Seth MD sent at 5/31/2022 10:34 AM EDT -----  Contact: Kal Britton [phone]  Start on z pack if no allergies  Also try mucinex   See us if not better  ----- Message -----  From: Inge Luis  Sent: 5/31/2022  10:03 AM EDT  To: Radha Seth MD    Patient of Dr. Dorinda Morgan:    Patient has sinus, chest congestion, and feels short of breath. She has COPD and is using a nebulizer with Albuterol. She states her  and daughter had it and are well now, but she is getting worse. She is asking for antibiotics.     Abbey Stroud    Thank you

## 2022-06-03 ENCOUNTER — HOSPITAL ENCOUNTER (EMERGENCY)
Age: 52
Discharge: HOME | End: 2022-06-03
Attending: EMERGENCY MEDICINE
Payer: OTHER GOVERNMENT

## 2022-06-03 ENCOUNTER — APPOINTMENT (OUTPATIENT)
Dept: GENERAL RADIOLOGY | Age: 52
End: 2022-06-03
Payer: OTHER GOVERNMENT

## 2022-06-03 VITALS
BODY MASS INDEX: 25.76 KG/M2 | SYSTOLIC BLOOD PRESSURE: 142 MMHG | HEART RATE: 64 BPM | WEIGHT: 170 LBS | OXYGEN SATURATION: 98 % | DIASTOLIC BLOOD PRESSURE: 76 MMHG | TEMPERATURE: 98.3 F | RESPIRATION RATE: 18 BRPM | HEIGHT: 68 IN

## 2022-06-03 DIAGNOSIS — R06.00 DYSPNEA AND RESPIRATORY ABNORMALITIES: ICD-10-CM

## 2022-06-03 DIAGNOSIS — Z86.59 HISTORY OF ANXIETY: ICD-10-CM

## 2022-06-03 DIAGNOSIS — J06.9 RECENT URI: ICD-10-CM

## 2022-06-03 DIAGNOSIS — R06.1 INSPIRATORY STRIDOR: Primary | ICD-10-CM

## 2022-06-03 DIAGNOSIS — R06.89 DYSPNEA AND RESPIRATORY ABNORMALITIES: ICD-10-CM

## 2022-06-03 LAB
A/G RATIO: 1.7 (ref 1.1–2.2)
ALBUMIN SERPL-MCNC: 4.4 G/DL (ref 3.4–5)
ALP BLD-CCNC: 102 U/L (ref 40–129)
ALT SERPL-CCNC: 10 U/L (ref 10–40)
ANION GAP SERPL CALCULATED.3IONS-SCNC: 8 MMOL/L (ref 3–16)
AST SERPL-CCNC: 12 U/L (ref 15–37)
BASOPHILS ABSOLUTE: 0.1 K/UL (ref 0–0.2)
BASOPHILS RELATIVE PERCENT: 0.7 %
BILIRUB SERPL-MCNC: 0.3 MG/DL (ref 0–1)
BUN BLDV-MCNC: 12 MG/DL (ref 7–20)
CALCIUM SERPL-MCNC: 9 MG/DL (ref 8.3–10.6)
CHLORIDE BLD-SCNC: 102 MMOL/L (ref 99–110)
CO2: 30 MMOL/L (ref 21–32)
CREAT SERPL-MCNC: <0.5 MG/DL (ref 0.6–1.1)
EKG ATRIAL RATE: 68 BPM
EKG DIAGNOSIS: NORMAL
EKG P AXIS: 77 DEGREES
EKG P-R INTERVAL: 176 MS
EKG Q-T INTERVAL: 430 MS
EKG QRS DURATION: 96 MS
EKG QTC CALCULATION (BAZETT): 457 MS
EKG R AXIS: 78 DEGREES
EKG T AXIS: 75 DEGREES
EKG VENTRICULAR RATE: 68 BPM
EOSINOPHILS ABSOLUTE: 0.3 K/UL (ref 0–0.6)
EOSINOPHILS RELATIVE PERCENT: 3.5 %
GFR AFRICAN AMERICAN: >60
GFR NON-AFRICAN AMERICAN: >60
GLUCOSE BLD-MCNC: 115 MG/DL (ref 70–99)
HCT VFR BLD CALC: 39.5 % (ref 36–48)
HEMOGLOBIN: 13.6 G/DL (ref 12–16)
INFLUENZA A: NOT DETECTED
INFLUENZA B: NOT DETECTED
LYMPHOCYTES ABSOLUTE: 1.9 K/UL (ref 1–5.1)
LYMPHOCYTES RELATIVE PERCENT: 26.3 %
MCH RBC QN AUTO: 32.2 PG (ref 26–34)
MCHC RBC AUTO-ENTMCNC: 34.4 G/DL (ref 31–36)
MCV RBC AUTO: 93.6 FL (ref 80–100)
MONOCYTES ABSOLUTE: 0.6 K/UL (ref 0–1.3)
MONOCYTES RELATIVE PERCENT: 8 %
NEUTROPHILS ABSOLUTE: 4.5 K/UL (ref 1.7–7.7)
NEUTROPHILS RELATIVE PERCENT: 61.5 %
PDW BLD-RTO: 13.2 % (ref 12.4–15.4)
PLATELET # BLD: 171 K/UL (ref 135–450)
PMV BLD AUTO: 8.2 FL (ref 5–10.5)
POTASSIUM REFLEX MAGNESIUM: 3.8 MMOL/L (ref 3.5–5.1)
PRO-BNP: 31 PG/ML (ref 0–124)
RBC # BLD: 4.23 M/UL (ref 4–5.2)
SARS-COV-2 RNA, RT PCR: NOT DETECTED
SODIUM BLD-SCNC: 140 MMOL/L (ref 136–145)
TOTAL PROTEIN: 7 G/DL (ref 6.4–8.2)
TROPONIN: <0.01 NG/ML
WBC # BLD: 7.3 K/UL (ref 4–11)

## 2022-06-03 PROCEDURE — 83880 ASSAY OF NATRIURETIC PEPTIDE: CPT

## 2022-06-03 PROCEDURE — 96374 THER/PROPH/DIAG INJ IV PUSH: CPT

## 2022-06-03 PROCEDURE — 6370000000 HC RX 637 (ALT 250 FOR IP): Performed by: EMERGENCY MEDICINE

## 2022-06-03 PROCEDURE — 71045 X-RAY EXAM CHEST 1 VIEW: CPT

## 2022-06-03 PROCEDURE — 87636 SARSCOV2 & INF A&B AMP PRB: CPT

## 2022-06-03 PROCEDURE — 70360 X-RAY EXAM OF NECK: CPT

## 2022-06-03 PROCEDURE — 93005 ELECTROCARDIOGRAM TRACING: CPT | Performed by: EMERGENCY MEDICINE

## 2022-06-03 PROCEDURE — 93010 ELECTROCARDIOGRAM REPORT: CPT | Performed by: INTERNAL MEDICINE

## 2022-06-03 PROCEDURE — 84484 ASSAY OF TROPONIN QUANT: CPT

## 2022-06-03 PROCEDURE — 36415 COLL VENOUS BLD VENIPUNCTURE: CPT

## 2022-06-03 PROCEDURE — 80053 COMPREHEN METABOLIC PANEL: CPT

## 2022-06-03 PROCEDURE — 6360000002 HC RX W HCPCS: Performed by: EMERGENCY MEDICINE

## 2022-06-03 PROCEDURE — 85025 COMPLETE CBC W/AUTO DIFF WBC: CPT

## 2022-06-03 PROCEDURE — 99285 EMERGENCY DEPT VISIT HI MDM: CPT

## 2022-06-03 RX ORDER — DIPHENHYDRAMINE HYDROCHLORIDE 50 MG/ML
50 INJECTION INTRAMUSCULAR; INTRAVENOUS ONCE
Status: COMPLETED | OUTPATIENT
Start: 2022-06-03 | End: 2022-06-03

## 2022-06-03 RX ORDER — DEXAMETHASONE SODIUM PHOSPHATE 10 MG/ML
10 INJECTION, SOLUTION INTRAMUSCULAR; INTRAVENOUS ONCE
Status: COMPLETED | OUTPATIENT
Start: 2022-06-03 | End: 2022-06-03

## 2022-06-03 RX ADMIN — DEXAMETHASONE SODIUM PHOSPHATE 10 MG: 10 INJECTION, SOLUTION INTRAMUSCULAR; INTRAVENOUS at 03:19

## 2022-06-03 RX ADMIN — RACEPINEPHRINE HYDROCHLORIDE 11.25 MG: 11.25 SOLUTION RESPIRATORY (INHALATION) at 03:24

## 2022-06-03 RX ADMIN — DIPHENHYDRAMINE HYDROCHLORIDE 50 MG: 50 INJECTION, SOLUTION INTRAMUSCULAR; INTRAVENOUS at 03:20

## 2022-06-03 ASSESSMENT — PAIN - FUNCTIONAL ASSESSMENT
PAIN_FUNCTIONAL_ASSESSMENT: NONE - DENIES PAIN
PAIN_FUNCTIONAL_ASSESSMENT: NONE - DENIES PAIN

## 2022-06-03 NOTE — Clinical Note
Rolando Navarro was seen and treated in our emergency department on 6/3/2022. She may return to work on 06/04/2022. I recommend patient should be able to use her voice amplifying device while working for the next 2 weeks, if she needs to utilize this for a prolonged period, she needs primary care or ENT follow-up to establish this. If you have any questions or concerns, please don't hesitate to call.       Cynthia Carranza, DO

## 2022-06-03 NOTE — ED PROVIDER NOTES
Emergency Department Physician Note     Location: [place] [address] ED  6/3/2022    CHIEF COMPLAINT  Shortness of Breath (SOb x a week. Pt took home covid test which was negative. PCP put her on abx on May 31th. Denies vomiting. )      HISTORY OF PRESENT ILLNESS  Ayesha Washington is a 49 y.o. female presents to the ED with shortness of breath, for about a week, seems to be worsening, has been getting over a recent URI, thought she might have a sinus infection, cough, congestion, she called her primary care office a few days ago, they did start her on antibiotic, [facility] called in, no known fever, had taken a home COVID test that was negative, she does have history of COPD, states this does not feel the same, not wheezing/in her chest, has had a little nausea but no vomiting, no bowel or bladder changes, she does continue to smoke, she also has history of anxiety. She states that her voice worsened today after she was forced to talk a lot at work and was allowed to use her voice amplifying device/headset, she has tried her albuterol inhaler nebulizer without much improvement, she did have sick contacts-has been daughter had been sick but they recovered, no abdominal pain, no headache or neck stiffness, no earache or sore throat currently, no other complaints, modifying factors or associated symptoms. I have reviewed the following from the nursing documentation.     Past Medical History:   Diagnosis Date    COPD (chronic obstructive pulmonary disease) ([facility] [address]      Past Surgical History:   Procedure Laterality Date    TUBAL LIGATION       Family History   Problem Relation Age of Onset    Lung Cancer Mother     COPD Father     Diabetes type 2  Sister     Obesity Sister     Diabetes type 2  Brother     No Known Problems Brother      Social History     Socioeconomic History    Marital status:      Spouse name: Not on file    Number of children: Not on file    Years of education: Not on file   Sandy Miranda Highest education level: Not on file   Occupational History    Not on file   Tobacco Use    Smoking status: Current Every Day Smoker     Packs/day: 0.50    Smokeless tobacco: Never Used   Vaping Use    Vaping Use: Never used   Substance and Sexual Activity    Alcohol use: Yes     Comment: rare    Drug use: Not Currently    Sexual activity: Yes     Partners: Male   Other Topics Concern    Not on file   Social History Narrative    Not on file     Social Determinants of Health     Financial Resource Strain:     Difficulty of Paying Living Expenses: Not on file   Food Insecurity:     Worried About Running Out of Food in the Last Year: Not on file    Thang of Food in the Last Year: Not on file   Transportation Needs:     Lack of Transportation (Medical): Not on file    Lack of Transportation (Non-Medical): Not on file   Physical Activity:     Days of Exercise per Week: Not on file    Minutes of Exercise per Session: Not on file   Stress:     Feeling of Stress : Not on file   Social Connections:     Frequency of Communication with Friends and Family: Not on file    Frequency of Social Gatherings with Friends and Family: Not on file    Attends [employer] Services: Not on file    Active Member of [address] [place] [address] or Organizations: Not on file    Attends Club or Organization Meetings: Not on file    Marital Status: Not on file   Intimate Partner Violence:     Fear of Current or Ex-Partner: Not on file    Emotionally Abused: Not on file    Physically Abused: Not on file    Sexually Abused: Not on file   Housing Stability:     Unable to Pay for Housing in the Last Year: Not on file    Number of [place] in the Last Year: Not on file    Unstable Housing in the Last Year: Not on file     No current facility-administered medications for this encounter.      Current Outpatient Medications   Medication Sig Dispense Refill    azithromycin (ZITHROMAX) 250 MG tablet Take 1 tablet by mouth See Admin Instructions for 5 days 500mg on day 1 followed by 250mg on days 2 - 5 6 tablet 0    albuterol sulfate HFA (PROAIR HFA) 108 (90 Base) MCG/ACT inhaler Inhale 2 puffs into the lungs every 6 hours as needed for Wheezing 3 each 0    atorvastatin (LIPITOR) 20 MG tablet Take 1 tablet by mouth daily 90 tablet 0    erythromycin (ROMYCIN) 5 MG/GM ophthalmic ointment Apply one centimeter ribbon to lower lid, of both eyes, 3x a day for 7-10 days. No refills 1 g 0    ipratropium-albuterol (DUONEB) 0.5-2.5 (3) MG/3ML SOLN nebulizer solution Inhale 3 mLs into the lungs every 4 hours as needed for Shortness of Breath 1620 mL 0    albuterol (PROVENTIL) (2.5 MG/3ML) 0.083% nebulizer solution Take 3 mLs by nebulization every 6 hours as needed for Wheezing 120 each 2    Omega-3 Fatty Acids (FISH OIL) 1000 MG CAPS Take 3,000 mg by mouth 3 times daily       Allergies   Allergen Reactions    Ciprofloxacin     Levaquin [Levofloxacin]     Montelukast Itching    Oxycodone     Pcn [Penicillins]     Prednisone Hives    Vicodin [Hydrocodone-Acetaminophen]     Zyrtec [Cetirizine] Itching    Keflex [Cephalexin] Rash       REVIEW OF SYSTEMS  10 systems reviewed, pertinent positives per HPI otherwise noted to be negative. PHYSICAL EXAM   BP (!) 142/76   Pulse 64   Temp 98.3 °F (36.8 °C) (Oral)   Resp 18   Ht 5' 8\" (1.727 m)   Wt 170 lb (77.1 kg)   LMP 05/27/2022   SpO2 98%   BMI 25.85 kg/m²   GENERAL APPEARANCE: Awake and alert. Cooperative. No acute distress  HEAD: Normocephalic. Atraumatic. No lopez's sign. EYES: PERRL. EOM's grossly intact. No scleral icterus. No drainage. No periorbital ecchymosis. ENT: Mucous membranes are moist. Airway patent. Hoarse forced sounding inspiratory stridor, breathy sounding speech, with conversational dyspnea. No epistaxis. No otorrhea or rhinorrhea. No exudates, midline uvula, TMs without bulging/erythema/edema bilaterally  NECK: Supple. No rigidity  HEART: RRR.  No murmurs  LUNGS: Respirations unlabored, Lungs are clear to ausculation bilaterally, no wheezes/crackles/rhonchi   ABDOMEN: Soft. Non-distended. Non-tender. No guarding, no rebound tenderness, no rigidity. Normal bowel sounds. No McBurney's point tenderness, negative Rovsing's sign, negative Gautam's sign  EXTREMITIES: No peripheral edema. Moves all extremities equally. No obvious deformities. No calf tenderness  SKIN: Warm and dry. No acute rashes. NEUROLOGICAL: Alert and oriented x4. No gross facial drooping. Normal speech, steady gait  PSYCHIATRIC: Normal mood and affect. LABS  I have reviewed all labs for this visit.    Results for orders placed or performed during the hospital encounter of 06/03/22   COVID-19 & Influenza Combo    Specimen: Nasopharyngeal Swab   Result Value Ref Range    SARS-CoV-2 RNA, RT PCR NOT DETECTED NOT DETECTED    INFLUENZA A NOT DETECTED NOT DETECTED    INFLUENZA B NOT DETECTED NOT DETECTED   CBC with Auto Differential   Result Value Ref Range    WBC 7.3 4.0 - 11.0 K/uL    RBC 4.23 4.00 - 5.20 M/uL    Hemoglobin 13.6 12.0 - 16.0 g/dL    Hematocrit 39.5 36.0 - 48.0 %    MCV 93.6 80.0 - 100.0 fL    MCH 32.2 26.0 - 34.0 pg    MCHC 34.4 31.0 - 36.0 g/dL    RDW 13.2 12.4 - 15.4 %    Platelets 187 476 - 752 K/uL    MPV 8.2 5.0 - 10.5 fL    Neutrophils % 61.5 %    Lymphocytes % 26.3 %    Monocytes % 8.0 %    Eosinophils % 3.5 %    Basophils % 0.7 %    Neutrophils Absolute 4.5 1.7 - 7.7 K/uL    Lymphocytes Absolute 1.9 1.0 - 5.1 K/uL    Monocytes Absolute 0.6 0.0 - 1.3 K/uL    Eosinophils Absolute 0.3 0.0 - 0.6 K/uL    Basophils Absolute 0.1 0.0 - 0.2 K/uL   Comprehensive Metabolic Panel w/ Reflex to MG   Result Value Ref Range    Sodium 140 136 - 145 mmol/L    Potassium reflex Magnesium 3.8 3.5 - 5.1 mmol/L    Chloride 102 99 - 110 mmol/L    CO2 30 21 - 32 mmol/L    Anion Gap 8 3 - 16    Glucose 115 (H) 70 - 99 mg/dL    BUN 12 7 - 20 mg/dL    CREATININE <0.5 (L) 0.6 - 1.1 mg/dL    GFR Non- American >60 >60    GFR African American >60 >60    Calcium 9.0 8.3 - 10.6 mg/dL    Total Protein 7.0 6.4 - 8.2 g/dL    Albumin 4.4 3.4 - 5.0 g/dL    Albumin/Globulin Ratio 1.7 1.1 - 2.2    Total Bilirubin 0.3 0.0 - 1.0 mg/dL    Alkaline Phosphatase 102 40 - 129 U/L    ALT 10 10 - 40 U/L    AST 12 (L) 15 - 37 U/L   Troponin   Result Value Ref Range    Troponin <0.01 <0.01 ng/mL   Brain Natriuretic Peptide   Result Value Ref Range    Pro-BNP 31 0 - 124 pg/mL       RADIOLOGY  XR NECK SOFT TISSUE    Result Date: 6/3/2022  EXAMINATION: TWO XRAY VIEWS OF THE NECK SOFT TISSUES 6/3/2022 3:06 am COMPARISON: 01/31/2020 HISTORY: ORDERING SYSTEM PROVIDED HISTORY: stridor TECHNOLOGIST PROVIDED HISTORY: Reason for exam:->stridor Reason for Exam: stridor, sob, difficutly breathing FINDINGS: Epiglottis is normal.  Subglottic airways patent. No prevertebral soft tissue swelling. No radiopaque foreign body. No acute abnormality. XR CHEST PORTABLE    Result Date: 6/3/2022  EXAMINATION: ONE XRAY VIEW OF THE CHEST 6/3/2022 12:42 am COMPARISON: 10/26/2020 HISTORY: ORDERING SYSTEM PROVIDED HISTORY: Sob TECHNOLOGIST PROVIDED HISTORY: Reason for exam:->Sob Reason for Exam: SOB, sore throat FINDINGS: The cardiomediastinal silhouette is normal in size and contour. The lungs are clear. No pleural effusion or pneumothorax is present. No acute cardiopulmonary process       ED COURSE/MDM  Patient seen and evaluated. Old records reviewed. Labs and imaging reviewed and results discussed with patient.      49 y.o. female with shortness of breath, no obvious cause, airway appears widely patent, while obtaining work-up, I offered to treat for airway narrowing, treating as potential reactive upper airway or allergy mediated, given Benadryl, Decadron long-acting steroid, and tried racemic epi given the inspiratory stridor sound, nursing stated patient questioned each medication, and did not actually appear to be inhaling the racemic epi, 2.25 % nebulizer solution NEBU 11.25 mg     ED Course as of 06/03/22 0524   Fri Jun 03, 2022   0240 EKG 12 Lead  EKG interpretation by me: Normal sinus rhythm, baseline wander, rate 68, QTc 457, normal axis, no ST segment or T wave changes indicative of acute ischemia [SY]   0308 She refused anything for anxiety [SY]   7751 Patient was sleeping comfortably when I entered the room, no noisy breathing, no stridor or wheezing. Went in to check on the patient, and as she began talking she began doing the inspiratory stridor again, this seems to be intentional [SY]      ED Course User Index  [SY] Rebecca Villalta DO           CLINICAL IMPRESSION  1. Inspiratory stridor    2. Recent URI    3. Dyspnea and respiratory abnormalities    4. History of anxiety        Blood pressure (!) 142/76, pulse 64, temperature 98.3 °F (36.8 °C), temperature source Oral, resp. rate 18, height 5' 8\" (1.727 m), weight 170 lb (77.1 kg), last menstrual period 05/27/2022, SpO2 98 %. Taiwo Dunaway was discharged to home in stable condition.                   Rebecca Villalta DO  06/06/22 3896

## 2022-06-03 NOTE — ED NOTES
All discharge paperwork and follow-up instructions reviewed with pt. Pt verbalized understanding. Pt ambulatory upon discharge in stable condition to private vehicle with .        Venessa Jo RN  06/03/22 1356

## 2022-06-03 NOTE — ED NOTES
While pt receiving breathing treatment, breathing normalized - no stridor, wheezing or noisy breathing noted. When pt took breathing treatment out of mouth, pt then had noisy breathing again noted. Notified Dr. Felipe Mei.      Matthew Lucero RN  06/03/22 7029

## 2022-06-08 ENCOUNTER — TELEPHONE (OUTPATIENT)
Dept: INTERNAL MEDICINE CLINIC | Age: 52
End: 2022-06-08

## 2022-06-08 ENCOUNTER — OFFICE VISIT (OUTPATIENT)
Dept: ENT CLINIC | Age: 52
End: 2022-06-08
Payer: OTHER GOVERNMENT

## 2022-06-08 VITALS
HEIGHT: 68 IN | BODY MASS INDEX: 25.28 KG/M2 | TEMPERATURE: 97.7 F | WEIGHT: 166.8 LBS | SYSTOLIC BLOOD PRESSURE: 120 MMHG | HEART RATE: 72 BPM | DIASTOLIC BLOOD PRESSURE: 64 MMHG

## 2022-06-08 DIAGNOSIS — J38.1 VOCAL CORD POLYPS: Primary | ICD-10-CM

## 2022-06-08 DIAGNOSIS — R49.0 HOARSENESS OF VOICE: ICD-10-CM

## 2022-06-08 PROCEDURE — 31575 DIAGNOSTIC LARYNGOSCOPY: CPT | Performed by: OTOLARYNGOLOGY

## 2022-06-08 PROCEDURE — 99203 OFFICE O/P NEW LOW 30 MIN: CPT | Performed by: OTOLARYNGOLOGY

## 2022-06-08 RX ORDER — FLUTICASONE PROPIONATE 44 UG/1
2 AEROSOL, METERED RESPIRATORY (INHALATION) 2 TIMES DAILY
Qty: 10.6 G | Refills: 3 | Status: SHIPPED | OUTPATIENT
Start: 2022-06-08

## 2022-06-08 RX ORDER — OMEPRAZOLE 20 MG/1
20 CAPSULE, DELAYED RELEASE ORAL
Qty: 180 CAPSULE | Refills: 1 | Status: SHIPPED | OUTPATIENT
Start: 2022-06-08

## 2022-06-08 ASSESSMENT — ENCOUNTER SYMPTOMS
SINUS PAIN: 0
WHEEZING: 0
SHORTNESS OF BREATH: 0
RHINORRHEA: 0
ABDOMINAL PAIN: 0
SINUS PRESSURE: 0
VOICE CHANGE: 1
SORE THROAT: 1

## 2022-06-08 NOTE — TELEPHONE ENCOUNTER
----- Message from Elvira Villanueva sent at 6/8/2022  4:39 PM EDT -----  Contact: Mary Nettles [phone]  9 tomorrow per Dr. Roberto Segovia  ----- Message -----  From: Isabelle Kelly  Sent: 6/8/2022   2:16 PM EDT  To: Sedrick James MD    Patient saw Dr. Sayda Godwin today and he has surgery scheduled on 6/10/22 to remove polyp from vocal cord. She needs H&P.      Thank you

## 2022-06-08 NOTE — LETTER
[facility]    Surgery Schedule Request Form: 06/08/22        Marion Goldsmith      DATE OF SURGERY: 06-    TIME OF SURGERY:  11:00 AM            CONF #: ____________________       Patient Information:    Patient name: Gal Goel     YOB: 1970 Age/Sex:51 y.o./female    SS #:[id number]    Wt Readings from Last 1 Encounters:   06/08/22 166 lb 12.8 oz (75.7 kg)       Telephone Information:   Mobile [phone]     Home [phone]     Surgeon & Procedure Information:     Lead surgeon: Kelin Caba Co-Surgeon: rea  Phone: [phone] Fax: [phone]  PCP: Opal Mattson MD    Diagnosis: Bilateral vocal cord polyps   J38.1    Location: [facility]    Procedure name/CPT: DL microscope with excision of tumor [phone], possible tracheostomy tube placement [id number]    Procedure length: 1 hour Anesthesia: General    Special Equipment: Yes - operating microscope, microlaryngoscopy instruments     Patient Status: SDS (OP)   Please book case for 6/10/2022 at [facility]: no    Primary Payor Plan: [employer]  Member ID: [id number]   [facility] [address] name: Gal Goel    [] Implement attached clinical orders for patient.       Electronically signed by Jean-Paul Loera MD on 6/8/2022 at 2:00 PM

## 2022-06-08 NOTE — PROGRESS NOTES
[address], [address], [address] [place] [address]  P: [phone]       Patient     Madai Kill  1970    Chief Concern     Chief Complaint   Patient presents with    Sinus Problem     Patient states she has been having sinus issues and breathing issues. Patient went to her PCP and was given an antibiotic which she finished. She states when she is trying to breathe she can hear popping in her throat. Patient is also having a lot of drainage and coughing up phlegm. Breathing through her nose has been a problem on and off for atleast 7 years. Assessment and Plan      Diagnosis Orders   1. Vocal cord polyps  omeprazole (PRILOSEC) 20 MG delayed release capsule    fluticasone (FLOVENT HFA) 44 MCG/ACT inhaler   2. Hoarseness of voice       50-year-old female with 1 week of hoarse voice, on examination we appreciate large bilateral vocal cord polypslikely due to tobacco exposure and voice use. Given size of polyps, will plan for direct laryngoscopy, with microscope or telescope, with plan for bilateral true vocal cord polyp removal.  Given obstruction of the glottis there is a possibility that she may need a tracheostomy tube placement, and the risk of this has been outlined to the patient.     -Given the above, the patient is a candidate for direct laryngoscopy with removal of bilateral true vocal cord polyps, possible microscope or operating telescope use   -Risks and benefits of the procedure include damage to the lips, teeth, oral cavity or oropharynx, hoarseness, dysphagia/odynophagia, hemorrhage, voice changes or the need for further surgery  -Risk of tracheostomy tube placement outlined  -General anesthesia carries separate risks, to be discussed with her Anesthesiologist on the day of surgery   -Patient agrees with the above and wishes to proceed with the procedure as above  -We will start her on antireflux medication, Flovent inhalers  -PCP clearance requested    No follow-ups on file. History of Present Illness     Loss of voice approximately 1 week ago - significant asthenia, breathiness. Also states \"breathing is off\". States she had difficulty with inspiration in the ED 6/3/2022 and was treated with IV steroids, benadryl with improvement in symptoms. No throat pain, states neck arthritis with neck ROM occasionally. Worsens when she speaks for an extended period of time - works as a  in [employer] and speaks frequently. Smokes 1/2PPD, iced tea 1/2gal per day, states intermittent reflux symptoms.      Past Medical History     Past Medical History:   Diagnosis Date    COPD (chronic obstructive pulmonary disease) ([facility] [address]        Past Surgical History     Past Surgical History:   Procedure Laterality Date    TUBAL LIGATION         Family History     Family History   Problem Relation Age of Onset    Lung Cancer Mother     COPD Father     Diabetes type 2  Sister     Obesity Sister     Diabetes type 2  Brother     No Known Problems Brother        Social History     Social History     Tobacco Use    Smoking status: Current Every Day Smoker     Packs/day: 0.50    Smokeless tobacco: Never Used   Vaping Use    Vaping Use: Never used   Substance Use Topics    Alcohol use: Yes     Comment: rare    Drug use: Not Currently        Allergies     Allergies   Allergen Reactions    Ciprofloxacin     Levaquin [Levofloxacin]     Montelukast Itching    Oxycodone     Pcn [Penicillins]     Prednisone Hives    Vicodin [Hydrocodone-Acetaminophen]     Zyrtec [Cetirizine] Itching    Keflex [Cephalexin] Rash       Medications     Current Outpatient Medications   Medication Sig Dispense Refill    omeprazole (PRILOSEC) 20 MG delayed release capsule Take 1 capsule by mouth 2 times daily (before meals) 180 capsule 1    fluticasone (FLOVENT HFA) 44 MCG/ACT inhaler Inhale 2 puffs into the lungs 2 times daily 10.6 g 3    albuterol sulfate HFA (PROAIR HFA) 108 (90 Base) MCG/ACT inhaler Inhale 2 puffs into the lungs every 6 hours as needed for Wheezing 3 each 0    albuterol (PROVENTIL) (2.5 MG/3ML) 0.083% nebulizer solution Take 3 mLs by nebulization every 6 hours as needed for Wheezing 120 each 2    Omega-3 Fatty Acids (FISH OIL) 1000 MG CAPS Take 3,000 mg by mouth 3 times daily      atorvastatin (LIPITOR) 20 MG tablet Take 1 tablet by mouth daily (Patient not taking: Reported on 6/8/2022) 90 tablet 0    erythromycin (ROMYCIN) 5 MG/GM ophthalmic ointment Apply one centimeter ribbon to lower lid, of both eyes, 3x a day for 7-10 days. No refills (Patient not taking: Reported on 6/8/2022) 1 g 0    ipratropium-albuterol (DUONEB) 0.5-2.5 (3) MG/3ML SOLN nebulizer solution Inhale 3 mLs into the lungs every 4 hours as needed for Shortness of Breath 1620 mL 0     No current facility-administered medications for this visit. Review of Systems     Review of Systems   Constitutional: Negative for activity change, chills, diaphoresis, fatigue and fever. HENT: Positive for sore throat and voice change. Negative for congestion, hearing loss, rhinorrhea, sinus pressure and sinus pain. Eyes: Negative for visual disturbance. Respiratory: Negative for shortness of breath and wheezing. Cardiovascular: Negative for chest pain. Gastrointestinal: Negative for abdominal pain. Musculoskeletal: Negative for neck pain and neck stiffness. Skin: Negative for rash. Neurological: Negative for dizziness, light-headedness and headaches. Hematological: Negative for adenopathy. PhysicalExam     Vitals:    06/08/22 1333   BP: 120/64   Site: Right Upper Arm   Position: Sitting   Pulse: 72   Temp: 97.7 °F (36.5 °C)   TempSrc: Infrared   Weight: 166 lb 12.8 oz (75.7 kg)   Height: 5' 8\" (1.727 m)       Physical Exam  Vitals reviewed. Constitutional:       Appearance: Normal appearance.       Comments: Significant hoarseness, asthenia -no shayy stridor    HENT:      Head: Normocephalic and atraumatic. Right Ear: Tympanic membrane, ear canal and external ear normal. There is no impacted cerumen. Left Ear: Tympanic membrane, ear canal and external ear normal. There is no impacted cerumen. Ears:      [place] exam findings: does not lateralize. Right Rinne: AC > BC. Left Rinne: AC > BC. Nose: No congestion or rhinorrhea. Mouth/Throat:      Lips: Pink. No lesions. Mouth: Mucous membranes are moist. No oral lesions. Tongue: No lesions. Tongue does not deviate from midline. Palate: No mass and lesions. Pharynx: Oropharynx is clear. Uvula midline. No oropharyngeal exudate or posterior oropharyngeal erythema. Eyes:      Extraocular Movements: Extraocular movements intact. Pupils: Pupils are equal, round, and reactive to light. Musculoskeletal:      Cervical back: Normal range of motion and neck supple. Lymphadenopathy:      Cervical: No cervical adenopathy. Neurological:      Mental Status: She is alert. Cranial Nerves: No cranial nerve deficit. Data Review        Procedure     Flexible Laryngoscopy    Pre op: Worsenings, breathiness,. Post op: Large vocal cord polyposis bilateral  Procedure : Flexible Nasopharyngolaryngoscopy  Surgeon: SELAM Do  Anesthesia: Afrin with 2% lidocaine  Estimated Blood Loss: None    Procedure:   Flexible Laryngoscopy     After obtaining consent, the patient was placed in the examination chair in the upright position.   Decongestant and topical anesthetic was sprayed in the After allowing adequate time for hemostatic effect, the flexible 4 mm laryngoscope was passed via the bilateral nasal passages    Nasal Septum: No acute septal deviation, no septal hematoma or perforations noted   Nasal Findings: No active hemorrhage, no nasal masses appreciated   Nasopharynx: Clear, no masses or inflammation  Oropharynx: Bilateral tonsillar tissue absent, no exophytic masses  Base of Tongue: Lingual tonsils within normal limits, vallecula without effacement  Epiglottis: Upright, in normal anatomical position  True Vocal Cord: Bilateral vocal cord polyps, enlarged, with obstruction of the alycia glottitis  False Vocal Cord: normal   Hypopharynx Mucosa: No masses or inflammation of the piriform sinuses or postcricoid area  Arytenoids: Normal mucosa, no dislocation appreciated     * Patient tolerated the procedure well with no complications   * Patient was instructed not to eat for 30 minutes following procedure. * Patient was instructed that they may notice minor bleeding. Attestation:   I was present for the entire viewing, including introduction and removal of the scope. Christy Robb MD  6/8/22    Portions of this note were dictated using Dragon.  There may be linguistic errors secondary to the use of this program.

## 2022-06-08 NOTE — Clinical Note
Dr. Merrill Willis,  Ms. Cora Lane has huge vocal cord polyps that are obstructing her airway-not to the point where she needs emergent surgery, but I like to get her on the OR schedule as soon as possible-as early as this Friday, 6/10/2022 if possible. She has history of COPD, would you be able to see her on short notice for surgical clearance or perform this via telemedicine? Best,  Hans Campa.

## 2022-06-09 ENCOUNTER — TELEPHONE (OUTPATIENT)
Dept: ENT CLINIC | Age: 52
End: 2022-06-09

## 2022-06-09 ENCOUNTER — OFFICE VISIT (OUTPATIENT)
Dept: INTERNAL MEDICINE CLINIC | Age: 52
End: 2022-06-09

## 2022-06-09 VITALS
WEIGHT: 165 LBS | HEART RATE: 72 BPM | HEIGHT: 68 IN | BODY MASS INDEX: 25.01 KG/M2 | SYSTOLIC BLOOD PRESSURE: 122 MMHG | DIASTOLIC BLOOD PRESSURE: 70 MMHG | RESPIRATION RATE: 12 BRPM

## 2022-06-09 DIAGNOSIS — J38.1 VOCAL CORD POLYPS: ICD-10-CM

## 2022-06-09 DIAGNOSIS — E78.5 HYPERLIPIDEMIA, UNSPECIFIED HYPERLIPIDEMIA TYPE: ICD-10-CM

## 2022-06-09 DIAGNOSIS — J44.9 CHRONIC OBSTRUCTIVE PULMONARY DISEASE, UNSPECIFIED COPD TYPE (HCC): ICD-10-CM

## 2022-06-09 DIAGNOSIS — Z01.818 PREOP EXAM FOR INTERNAL MEDICINE: Primary | ICD-10-CM

## 2022-06-09 PROCEDURE — 99214 OFFICE O/P EST MOD 30 MIN: CPT | Performed by: INTERNAL MEDICINE

## 2022-06-09 RX ORDER — ALBUTEROL SULFATE 90 UG/1
2 AEROSOL, METERED RESPIRATORY (INHALATION) EVERY 6 HOURS PRN
Qty: 3 EACH | Refills: 0 | Status: SHIPPED | OUTPATIENT
Start: 2022-06-09

## 2022-06-09 NOTE — PROGRESS NOTES
Madelyn Gill    Age 49 y.o.    female    1970    MRN [id number]    6/10/2022  Arrival Time_____________  OR Time____________[facility]     Procedure(s):  DIRECT LARYNGOSCOPY MICROSCOPE WITH EXCISION OF TUMOR, POSSIBLE  TRACHEOSTOMY TUBE PLACEMENT                      General   Surgeon(s):  Jessica Weir, MD      DAY ADMIT ___  SDS/OP ___  OUTPT IN BED ___        Phone [phone] (home) [phone] (work)    PCP _____________________ Phone_________________ [address] ( ) Epic CE ( ) Appt ________    ADDITIONAL INFO __________________________________ Cardio/Consult _____________    NOTES _____________________________________________________________________    ____________________________________________________________________________    PAT APPT DATE:________ TIME: ________  FAXED QAD: _______  (__) H&P w/ Hospitalist  __________________________________________________________________________  Preop Nurse phone screen complete: _____________  (__) CBC     (__) W/ DIFF ___________     (__) Hgb A1C    ___________  (__) CHEST X RAY   __________  (__) LIPID PROFILE  ___________  (__) EKG   __________  (__) PT/PTT   ___________  (__) PFT's   __________  (__) BMP   ___________  (__) CAROTIDS  __________  (__) CMP   ___________  (__) VEIN MAPPING  __________  (__) U/A   ___________  (__) HISTORY & PHYSICAL __________  (__) URINE C & S  ___________  (__) CARDIAC CLEARANCE __________  (__) U/A W/ FLEX  ___________  (__) PULM.  CLEARANCE __________  (__) SERUM PREGNANCY ___________  (__) Check Epic DOS orders __________  (__) TYPE & SCREEN __________repeat ( ) (__)  __________________ __________  (__) ALBUMIN Michael Moat ___________  (__)  __________________ __________  (__) TRANSFERRIN  ___________  (__)  __________________ __________  (__) LIVER PROFILE  ___________  (__)  __________________ __________  (__) MRSA NASAL SWAB ___________  (__) URINE PREG DOS __________  (__) SED RATE  ___________  (__) BLOOD SUGAR DOS __________  (__) C-REACTIVE PROTEIN ___________    (__) VITAMIN D HYDROXY ___________  (__) BLOOD THINNERS __________    (__) ACE/ ARBS: _____________________     (__) BETABLOCKERS __________________

## 2022-06-09 NOTE — PROGRESS NOTES
Obstructive Sleep Apnea (MELLISA) Screening     Patient:  Wai Martinez    YOB: 1970      Medical Record #:  [id number]                     Date:  6/9/2022     1. Are you a loud and/or regular snorer? [x]  Yes       [] No    2. Have you been observed to gasp or stop breathing during sleep? [x]  Yes       [] No    3. Do you feel tired or groggy upon awakening or do you awaken with a headache?           []  Yes       [x] No    4. Are you often tired or fatigued during the wake time hours? [x]  Yes       [] No    5. Do you fall asleep sitting, reading, watching TV or driving? [x]  Yes       [] No    6. Do you often have problems with memory or concentration? [x]  Yes       [] No    If patient's MELLISA score if greater than or equal to 3, they are considered high risk for MELLISA. An anesthesia provider will evaluate the patient and develop a plan of care the day of surgery. Note:  If the patient's BMI is more than 35 kg m¯² , has neck circumference > 40 cm, and/or high blood pressure the risk is greater (© American Sleep Apnea Association, 2006).

## 2022-06-09 NOTE — TELEPHONE ENCOUNTER
Spoke with patient to confirm ENT surgery to tomorrow 6/10/22. She states H&P has been done and she will arrive by 9:15. She did not want to make her post op appts at this time.

## 2022-06-09 NOTE — PROGRESS NOTES
Preoperative Screening for Elective Surgery/Invasive Procedures While COVID-19 present in the community     Have you had any of the following symptoms? o Fever, chills  o Cough  o Shortness of breath  o Muscle aches/pain  o Diarrhea  o Abdominal pain, nausea, vomiting  o Loss or decrease in taste and / or smell   Risk of Exposure  o Have you recently been hospitalized for COVID-19 or flu-like illness, if so when?  o Recently diagnosed with COVID-19, if so when?  o Recently tested for COVID-19, if so when?  o Have you been in close contact with a person or family member who currently has or recently had COVID-19? If yes, when and in what context?  o Do you live with anybody who in the last 14 days has had fever, chills, shortness of breath, muscle aches, flu-like illness?  o Do you have any close contacts or family members who are currently in the hospital for COVID-19 or flu-like illness? If yes, assess recent close contact with this person. Indicate if the patient has a positive screen by answering yes to one or more of the above questions. Patients who test positive or screen positive prior to surgery or on the day of surgery should be evaluated in conjunction with the surgeon/proceduralist/anesthesiologist to determine the urgency of the procedure.      Pt denies all; tested negative on 6/3/22

## 2022-06-09 NOTE — PROGRESS NOTES
1. Do not eat or drink anything after 12 midnight prior to surgery. This includes no water, chewing gum mints, or ice chips. You may brush your teeth and gargle the day of surgery but DO NOT SWALLOW THE WATER. 2. Please see your family doctor/pediatrician for a history and physical and/or concerning medications. Bring any test results/reports from your physician's office. If you are under the care of a heart doctor or specialist please be aware that you may be asked to see him or her for clearance. 3. You may be asked to stop blood thinners such as Coumadin, Plavix, Fragmin, and Lovenox or Anti-inflammatories such as Aspirin, Ibuprofen, Advil, and Naproxen prior to your surgery. Please check with your doctor before stopping these or any other medications. 4. Do not smoke, and do not drink any alcoholic beverages 24 hours prior to surgery. 5. You MUST make arrangements for a responsible adult to take you home after your surgery. For your safety, you will not be allowed to leave alone or drive yourself home. Your surgery will be cancelled if you do not have a ride home. Also for your safety, it is strongly suggested someone stay with you the first 24 hrs after your surgery. 6. A parent/legal guardian must accompany a child scheduled for surgery and plan to stay at the hospital until the child is discharged. Please do not bring other children with you. 7. For your comfort,please wear simple, loose fitting clothing to the hospital.  Please do not bring valuables (money, credit cards, checkbooks, etc.) Do not wear any makeup (including no eye makeup) or nail polish on your fingers or toes. 8. For your safety, please DO NOT wear any jewelry or piercings on day of surgery. All body piercing jewelry must be removed. 9. If you have dentures, they will be removed before going to the OR; for your convenience we will provide you with a container.   If you wear contact lenses or glasses, they will be removed, they will be removed, please bring a case for them. 10. If appicable,Please see your family doctor/pediatrician for a history & physical and/or concerning medications. Bring any test results/reports from your physician's office. 11. Remember to bring Blood Bank bracelet to the hospital on the day of surgery. 12. If you have a Living Will and Durable Power of  for Healthcare, please bring in a copy. 15. Notify your Surgeon if you develop any illness between now and surgery  time, cough, cold, fever, sore throat, nausea, vomiting, etc.  Please notify your surgeon if you experience dizziness, shortness of breath or blurred vision between now & the time of your surgery   14. DO NOT shave your operative site 96 hours prior to surgery. For face & neck surgery, men may use an electric razor 48 hours prior to surgery. 15. Shower the night before surgery with ___Antibacterial soap ___Hibiclens   16. To provide excellent care visitors will be limited to one in the room at any given time. 17.  Please bring picture ID and insurance card. 18.  Visit our web site for additional information:  [email or web address].

## 2022-06-09 NOTE — PROGRESS NOTES
Subjective:      Edward Zamudio is a 49 y.o. female who presents to the office today for a preoperative consultation at the request of surgeon Dr Reyna Galicia who plans on performing removal of vocal cord polyp. Planned anesthesia is General.       Past Medical History:   Diagnosis Date    COPD (chronic obstructive pulmonary disease) ([facility] [address]      Past Surgical History:   Procedure Laterality Date    TUBAL LIGATION       Family History   Problem Relation Age of Onset    Lung Cancer Mother     COPD Father     Diabetes type 2  Sister     Obesity Sister     Diabetes type 2  Brother     No Known Problems Brother      Social History     Socioeconomic History    Marital status:      Spouse name: None    Number of children: None    Years of education: None    Highest education level: None   Occupational History    None   Tobacco Use    Smoking status: Current Every Day Smoker     Packs/day: 0.50    Smokeless tobacco: Never Used   Vaping Use    Vaping Use: Never used   Substance and Sexual Activity    Alcohol use: Yes     Comment: rare    Drug use: Not Currently    Sexual activity: Yes     Partners: Male   Other Topics Concern    None   Social History Narrative    None     Social Determinants of Health     Financial Resource Strain:     Difficulty of Paying Living Expenses: Not on file   Food Insecurity:     Worried About Running Out of Food in the Last Year: Not on file    Thang of Food in the Last Year: Not on file   Transportation Needs:     Lack of Transportation (Medical): Not on file    Lack of Transportation (Non-Medical):  Not on file   Physical Activity:     Days of Exercise per Week: Not on file    Minutes of Exercise per Session: Not on file   Stress:     Feeling of Stress : Not on file   Social Connections:     Frequency of Communication with Friends and Family: Not on file    Frequency of Social Gatherings with Friends and Family: Not on file    Attends Latter day Services: Not on file   [address] [facility] [employer] or Organizations: Not on file    Attends Club or Organization Meetings: Not on file    Marital Status: Not on file   Intimate Partner Violence:     Fear of Current or Ex-Partner: Not on file    Emotionally Abused: Not on file    Physically Abused: Not on file    Sexually Abused: Not on file   Housing Stability:     Unable to Pay for Housing in the Last Year: Not on file    Number of [place] in the Last Year: Not on file    Unstable Housing in the Last Year: Not on file     Current Outpatient Medications   Medication Sig Dispense Refill    omeprazole (PRILOSEC) 20 MG delayed release capsule Take 1 capsule by mouth 2 times daily (before meals) 180 capsule 1    fluticasone (FLOVENT HFA) 44 MCG/ACT inhaler Inhale 2 puffs into the lungs 2 times daily 10.6 g 3    albuterol sulfate HFA (PROAIR HFA) 108 (90 Base) MCG/ACT inhaler Inhale 2 puffs into the lungs every 6 hours as needed for Wheezing 3 each 0    atorvastatin (LIPITOR) 20 MG tablet Take 1 tablet by mouth daily (Patient not taking: Reported on 6/8/2022) 90 tablet 0    erythromycin (ROMYCIN) 5 MG/GM ophthalmic ointment Apply one centimeter ribbon to lower lid, of both eyes, 3x a day for 7-10 days. No refills (Patient not taking: Reported on 6/8/2022) 1 g 0    ipratropium-albuterol (DUONEB) 0.5-2.5 (3) MG/3ML SOLN nebulizer solution Inhale 3 mLs into the lungs every 4 hours as needed for Shortness of Breath 1620 mL 0    albuterol (PROVENTIL) (2.5 MG/3ML) 0.083% nebulizer solution Take 3 mLs by nebulization every 6 hours as needed for Wheezing 120 each 2    Omega-3 Fatty Acids (FISH OIL) 1000 MG CAPS Take 3,000 mg by mouth 3 times daily       No current facility-administered medications for this visit.      Allergies   Allergen Reactions    Ciprofloxacin     Levaquin [Levofloxacin]     Montelukast Itching    Oxycodone     Pcn [Penicillins]     Prednisone Hives    Vicodin [Hydrocodone-Acetaminophen]     Zyrtec [Cetirizine] Itching    Keflex [Cephalexin] Rash     Review of Systems  A comprehensive review of systems was negative. Planned anesthesia: General  Known anesthesia problems: no  Bleeding risk: no  Personal or FH of DVT/PE: DVT in 1999  Patient objection to receiving blood products: no     Objective:      /70 (Site: Left Upper Arm, Position: Sitting, Cuff Size: Medium Adult)   Pulse 72   Resp 12   Ht 5' 8\" (1.727 m)   Wt 165 lb (74.8 kg)   LMP 05/27/2022   BMI 25.09 kg/m²     General Appearance:  Alert, cooperative, no distress, appears stated age   Head:  Normocephalic, without obvious abnormality, atraumatic   Eyes:  PERRL, conjunctiva/corneas clear, EOM's intact   Ears:  deferred   Nose: Nares normal, septum midline,mucosa normal, no drainage or sinus tenderness   Throat: Lips, mucosa, and tongue normal; teeth and gums normal   Neck: Supple, symmetrical, trachea midline, no adenopathy;  thyroid: not enlarged, symmetric, no tenderness/mass/nodules; no carotid bruit or JVD   Back:   deferred   Lungs:   Clear to auscultation bilaterally, respirations unlabored   Breasts:  deferred   Heart:  Regular rate and rhythm, S1 and S2 normal, no murmur, rub, or gallop   Abdomen:   Soft, non-tender, bowel sounds active all four quadrants,  no masses, no organomegaly   Pelvic: Deferred   Extremities: Extremities normal, atraumatic, no cyanosis or edema   Pulses: 2+ and symmetric   Skin: Skin color, texture, turgor normal, no rashes or lesions   Lymph nodes: Cervical, supraclavicular, and axillary nodes normal   Neurologic: Normal                Assessment:       Diagnosis Orders   1. Preop exam for internal medicine     2. Vocal cord polyps     3. Hyperlipidemia, unspecified hyperlipidemia type     4. Chronic obstructive pulmonary disease, unspecified [facility]           49 y.o. female with planned surgery as above. Plan:     Patient medically cleared for above planned procedure.  She will take her inhalers before her surgery.

## 2022-06-10 ENCOUNTER — ANESTHESIA EVENT (OUTPATIENT)
Dept: OPERATING ROOM | Age: 52
End: 2022-06-10
Payer: OTHER GOVERNMENT

## 2022-06-10 ENCOUNTER — ANESTHESIA (OUTPATIENT)
Dept: OPERATING ROOM | Age: 52
End: 2022-06-10
Payer: OTHER GOVERNMENT

## 2022-06-10 ENCOUNTER — HOSPITAL ENCOUNTER (OUTPATIENT)
Age: 52
Setting detail: OUTPATIENT SURGERY
Discharge: HOME | End: 2022-06-10
Attending: OTOLARYNGOLOGY | Admitting: OTOLARYNGOLOGY
Payer: OTHER GOVERNMENT

## 2022-06-10 VITALS
HEIGHT: 68 IN | TEMPERATURE: 97 F | WEIGHT: 166 LBS | HEART RATE: 83 BPM | RESPIRATION RATE: 16 BRPM | BODY MASS INDEX: 25.16 KG/M2 | SYSTOLIC BLOOD PRESSURE: 134 MMHG | OXYGEN SATURATION: 95 % | DIASTOLIC BLOOD PRESSURE: 73 MMHG

## 2022-06-10 DIAGNOSIS — J38.1 REINKE'S EDEMA OF VOCAL FOLDS: Primary | ICD-10-CM

## 2022-06-10 DIAGNOSIS — J38.1 VOCAL CORD POLYPS: ICD-10-CM

## 2022-06-10 LAB — PREGNANCY, URINE: NEGATIVE

## 2022-06-10 PROCEDURE — 3700000001 HC ADD 15 MINUTES (ANESTHESIA): Performed by: OTOLARYNGOLOGY

## 2022-06-10 PROCEDURE — 7100000001 HC PACU RECOVERY - ADDTL 15 MIN: Performed by: OTOLARYNGOLOGY

## 2022-06-10 PROCEDURE — 3700000000 HC ANESTHESIA ATTENDED CARE: Performed by: OTOLARYNGOLOGY

## 2022-06-10 PROCEDURE — 31541 LARYNSCOP W/TUMR EXC + SCOPE: CPT | Performed by: OTOLARYNGOLOGY

## 2022-06-10 PROCEDURE — 7100000011 HC PHASE II RECOVERY - ADDTL 15 MIN: Performed by: OTOLARYNGOLOGY

## 2022-06-10 PROCEDURE — 2500000003 HC RX 250 WO HCPCS

## 2022-06-10 PROCEDURE — 84703 CHORIONIC GONADOTROPIN ASSAY: CPT

## 2022-06-10 PROCEDURE — 3600000004 HC SURGERY LEVEL 4 BASE: Performed by: OTOLARYNGOLOGY

## 2022-06-10 PROCEDURE — 2580000003 HC RX 258: Performed by: ANESTHESIOLOGY

## 2022-06-10 PROCEDURE — 2709999900 HC NON-CHARGEABLE SUPPLY: Performed by: OTOLARYNGOLOGY

## 2022-06-10 PROCEDURE — 6370000000 HC RX 637 (ALT 250 FOR IP): Performed by: OTOLARYNGOLOGY

## 2022-06-10 PROCEDURE — 6360000002 HC RX W HCPCS

## 2022-06-10 PROCEDURE — 88305 TISSUE EXAM BY PATHOLOGIST: CPT

## 2022-06-10 PROCEDURE — 2500000003 HC RX 250 WO HCPCS: Performed by: OTOLARYNGOLOGY

## 2022-06-10 PROCEDURE — 3600000014 HC SURGERY LEVEL 4 ADDTL 15MIN: Performed by: OTOLARYNGOLOGY

## 2022-06-10 PROCEDURE — 88342 IMHCHEM/IMCYTCHM 1ST ANTB: CPT

## 2022-06-10 PROCEDURE — 7100000000 HC PACU RECOVERY - FIRST 15 MIN: Performed by: OTOLARYNGOLOGY

## 2022-06-10 PROCEDURE — 7100000010 HC PHASE II RECOVERY - FIRST 15 MIN: Performed by: OTOLARYNGOLOGY

## 2022-06-10 PROCEDURE — 88312 SPECIAL STAINS GROUP 1: CPT

## 2022-06-10 RX ORDER — LIDOCAINE HYDROCHLORIDE AND EPINEPHRINE 10; 10 MG/ML; UG/ML
INJECTION, SOLUTION INFILTRATION; PERINEURAL PRN
Status: DISCONTINUED | OUTPATIENT
Start: 2022-06-10 | End: 2022-06-10 | Stop reason: HOSPADM

## 2022-06-10 RX ORDER — ONDANSETRON 2 MG/ML
4 INJECTION INTRAMUSCULAR; INTRAVENOUS
Status: CANCELLED | OUTPATIENT
Start: 2022-06-10 | End: 2022-06-10

## 2022-06-10 RX ORDER — LABETALOL HYDROCHLORIDE 5 MG/ML
10 INJECTION, SOLUTION INTRAVENOUS
Status: CANCELLED | OUTPATIENT
Start: 2022-06-10

## 2022-06-10 RX ORDER — ONDANSETRON 2 MG/ML
INJECTION INTRAMUSCULAR; INTRAVENOUS PRN
Status: DISCONTINUED | OUTPATIENT
Start: 2022-06-10 | End: 2022-06-10 | Stop reason: SDUPTHER

## 2022-06-10 RX ORDER — DIPHENHYDRAMINE HYDROCHLORIDE 50 MG/ML
12.5 INJECTION INTRAMUSCULAR; INTRAVENOUS
Status: CANCELLED | OUTPATIENT
Start: 2022-06-10 | End: 2022-06-10

## 2022-06-10 RX ORDER — SODIUM CHLORIDE 0.9 % (FLUSH) 0.9 %
5-40 SYRINGE (ML) INJECTION PRN
Status: CANCELLED | OUTPATIENT
Start: 2022-06-10

## 2022-06-10 RX ORDER — ONDANSETRON 4 MG/1
4 TABLET, ORALLY DISINTEGRATING ORAL 3 TIMES DAILY PRN
Qty: 21 TABLET | Refills: 0 | Status: SHIPPED | OUTPATIENT
Start: 2022-06-10

## 2022-06-10 RX ORDER — SODIUM CHLORIDE, SODIUM LACTATE, POTASSIUM CHLORIDE, CALCIUM CHLORIDE 600; 310; 30; 20 MG/100ML; MG/100ML; MG/100ML; MG/100ML
INJECTION, SOLUTION INTRAVENOUS CONTINUOUS
Status: DISCONTINUED | OUTPATIENT
Start: 2022-06-10 | End: 2022-06-10 | Stop reason: HOSPADM

## 2022-06-10 RX ORDER — MIDAZOLAM HYDROCHLORIDE 1 MG/ML
INJECTION INTRAMUSCULAR; INTRAVENOUS PRN
Status: DISCONTINUED | OUTPATIENT
Start: 2022-06-10 | End: 2022-06-10 | Stop reason: SDUPTHER

## 2022-06-10 RX ORDER — FENTANYL CITRATE 50 UG/ML
INJECTION, SOLUTION INTRAMUSCULAR; INTRAVENOUS PRN
Status: DISCONTINUED | OUTPATIENT
Start: 2022-06-10 | End: 2022-06-10 | Stop reason: SDUPTHER

## 2022-06-10 RX ORDER — ESMOLOL HYDROCHLORIDE 10 MG/ML
INJECTION INTRAVENOUS PRN
Status: DISCONTINUED | OUTPATIENT
Start: 2022-06-10 | End: 2022-06-10 | Stop reason: SDUPTHER

## 2022-06-10 RX ORDER — DEXAMETHASONE SODIUM PHOSPHATE 4 MG/ML
INJECTION, SOLUTION INTRA-ARTICULAR; INTRALESIONAL; INTRAMUSCULAR; INTRAVENOUS; SOFT TISSUE PRN
Status: DISCONTINUED | OUTPATIENT
Start: 2022-06-10 | End: 2022-06-10 | Stop reason: SDUPTHER

## 2022-06-10 RX ORDER — GLYCOPYRROLATE 0.2 MG/ML
INJECTION INTRAMUSCULAR; INTRAVENOUS PRN
Status: DISCONTINUED | OUTPATIENT
Start: 2022-06-10 | End: 2022-06-10 | Stop reason: SDUPTHER

## 2022-06-10 RX ORDER — LIDOCAINE HYDROCHLORIDE 10 MG/ML
2 INJECTION, SOLUTION INFILTRATION; PERINEURAL
Status: DISCONTINUED | OUTPATIENT
Start: 2022-06-10 | End: 2022-06-10 | Stop reason: HOSPADM

## 2022-06-10 RX ORDER — LIDOCAINE HYDROCHLORIDE 20 MG/ML
INJECTION, SOLUTION EPIDURAL; INFILTRATION; INTRACAUDAL; PERINEURAL PRN
Status: DISCONTINUED | OUTPATIENT
Start: 2022-06-10 | End: 2022-06-10 | Stop reason: SDUPTHER

## 2022-06-10 RX ORDER — IBUPROFEN 600 MG/1
600 TABLET ORAL 4 TIMES DAILY PRN
Qty: 120 TABLET | Refills: 0 | Status: SHIPPED | OUTPATIENT
Start: 2022-06-10

## 2022-06-10 RX ORDER — SODIUM CHLORIDE 9 MG/ML
25 INJECTION, SOLUTION INTRAVENOUS PRN
Status: CANCELLED | OUTPATIENT
Start: 2022-06-10

## 2022-06-10 RX ORDER — ROCURONIUM BROMIDE 10 MG/ML
INJECTION, SOLUTION INTRAVENOUS PRN
Status: DISCONTINUED | OUTPATIENT
Start: 2022-06-10 | End: 2022-06-10 | Stop reason: SDUPTHER

## 2022-06-10 RX ORDER — PROPOFOL 10 MG/ML
INJECTION, EMULSION INTRAVENOUS PRN
Status: DISCONTINUED | OUTPATIENT
Start: 2022-06-10 | End: 2022-06-10 | Stop reason: SDUPTHER

## 2022-06-10 RX ORDER — ACETAMINOPHEN 500 MG
500 TABLET ORAL 4 TIMES DAILY PRN
Qty: 120 TABLET | Refills: 0 | Status: SHIPPED | OUTPATIENT
Start: 2022-06-10

## 2022-06-10 RX ORDER — SODIUM CHLORIDE 0.9 % (FLUSH) 0.9 %
5-40 SYRINGE (ML) INJECTION EVERY 12 HOURS SCHEDULED
Status: CANCELLED | OUTPATIENT
Start: 2022-06-10

## 2022-06-10 RX ORDER — AZITHROMYCIN 250 MG/1
250 TABLET, FILM COATED ORAL SEE ADMIN INSTRUCTIONS
Qty: 6 TABLET | Refills: 0 | Status: SHIPPED | OUTPATIENT
Start: 2022-06-10 | End: 2022-06-15

## 2022-06-10 RX ORDER — MEPERIDINE HYDROCHLORIDE 50 MG/ML
12.5 INJECTION INTRAMUSCULAR; INTRAVENOUS; SUBCUTANEOUS EVERY 5 MIN PRN
Status: CANCELLED | OUTPATIENT
Start: 2022-06-10

## 2022-06-10 RX ORDER — OXYMETAZOLINE HYDROCHLORIDE 0.05 G/100ML
SPRAY NASAL PRN
Status: DISCONTINUED | OUTPATIENT
Start: 2022-06-10 | End: 2022-06-10 | Stop reason: HOSPADM

## 2022-06-10 RX ADMIN — FENTANYL CITRATE 50 MCG: 50 INJECTION INTRAMUSCULAR; INTRAVENOUS at 09:52

## 2022-06-10 RX ADMIN — PROPOFOL 150 MG: 10 INJECTION, EMULSION INTRAVENOUS at 09:55

## 2022-06-10 RX ADMIN — ESMOLOL HYDROCHLORIDE 20 MG: 10 INJECTION, SOLUTION INTRAVENOUS at 12:28

## 2022-06-10 RX ADMIN — SODIUM CHLORIDE, POTASSIUM CHLORIDE, SODIUM LACTATE AND CALCIUM CHLORIDE: 600; 310; 30; 20 INJECTION, SOLUTION INTRAVENOUS at 11:39

## 2022-06-10 RX ADMIN — GLYCOPYRROLATE 0.2 MG: 0.2 INJECTION, SOLUTION INTRAMUSCULAR; INTRAVENOUS at 09:51

## 2022-06-10 RX ADMIN — FENTANYL CITRATE 50 MCG: 50 INJECTION INTRAMUSCULAR; INTRAVENOUS at 11:39

## 2022-06-10 RX ADMIN — MIDAZOLAM HYDROCHLORIDE 2 MG: 2 INJECTION, SOLUTION INTRAMUSCULAR; INTRAVENOUS at 09:43

## 2022-06-10 RX ADMIN — LIDOCAINE HYDROCHLORIDE 100 MG: 20 INJECTION, SOLUTION EPIDURAL; INFILTRATION; INTRACAUDAL; PERINEURAL at 09:55

## 2022-06-10 RX ADMIN — ROCURONIUM BROMIDE 10 MG: 10 SOLUTION INTRAVENOUS at 10:47

## 2022-06-10 RX ADMIN — ESMOLOL HYDROCHLORIDE 20 MG: 10 INJECTION, SOLUTION INTRAVENOUS at 10:21

## 2022-06-10 RX ADMIN — ROCURONIUM BROMIDE 15 MG: 10 SOLUTION INTRAVENOUS at 11:24

## 2022-06-10 RX ADMIN — FENTANYL CITRATE 50 MCG: 50 INJECTION INTRAMUSCULAR; INTRAVENOUS at 12:44

## 2022-06-10 RX ADMIN — ROCURONIUM BROMIDE 10 MG: 10 SOLUTION INTRAVENOUS at 10:22

## 2022-06-10 RX ADMIN — ONDANSETRON 4 MG: 2 INJECTION INTRAMUSCULAR; INTRAVENOUS at 09:50

## 2022-06-10 RX ADMIN — FENTANYL CITRATE 50 MCG: 50 INJECTION INTRAMUSCULAR; INTRAVENOUS at 09:58

## 2022-06-10 RX ADMIN — PROPOFOL 30 MG: 10 INJECTION, EMULSION INTRAVENOUS at 12:21

## 2022-06-10 RX ADMIN — DEXAMETHASONE SODIUM PHOSPHATE 12 MG: 4 INJECTION, SOLUTION INTRAMUSCULAR; INTRAVENOUS at 10:04

## 2022-06-10 RX ADMIN — SODIUM CHLORIDE, POTASSIUM CHLORIDE, SODIUM LACTATE AND CALCIUM CHLORIDE: 600; 310; 30; 20 INJECTION, SOLUTION INTRAVENOUS at 09:23

## 2022-06-10 RX ADMIN — ROCURONIUM BROMIDE 50 MG: 10 SOLUTION INTRAVENOUS at 09:55

## 2022-06-10 RX ADMIN — SUGAMMADEX 200 MG: 100 INJECTION, SOLUTION INTRAVENOUS at 12:27

## 2022-06-10 ASSESSMENT — PAIN SCALES - GENERAL
PAINLEVEL_OUTOF10: 0
PAINLEVEL_OUTOF10: 4

## 2022-06-10 ASSESSMENT — PAIN DESCRIPTION - PAIN TYPE: TYPE: SURGICAL PAIN

## 2022-06-10 ASSESSMENT — PAIN - FUNCTIONAL ASSESSMENT: PAIN_FUNCTIONAL_ASSESSMENT: 0-10

## 2022-06-10 ASSESSMENT — PAIN DESCRIPTION - LOCATION: LOCATION: THROAT

## 2022-06-10 NOTE — ANESTHESIA PRE PROCEDURE
Department of Anesthesiology  Preprocedure Note       Name:  Charlene Byrne   Age:  49 y.o.  :  1970                                          MRN:  [id number]         Date:  6/10/2022      Surgeon: Antonella Cano):  Aureliano Sosa MD    Procedure: Procedure(s):  DIRECT LARYNGOSCOPY MICROSCOPE WITH EXCISION OF TUMOR, POSSIBLE  TRACHEOSTOMY TUBE PLACEMENT    Medications prior to admission:   Prior to Admission medications    Medication Sig Start Date End Date Taking? Authorizing Provider   albuterol sulfate HFA (PROAIR HFA) 108 (90 Base) MCG/ACT inhaler Inhale 2 puffs into the lungs every 6 hours as needed for Wheezing 22   Marjan Villa MD   omeprazole (PRILOSEC) 20 MG delayed release capsule Take 1 capsule by mouth 2 times daily (before meals) 22   Aureliano Sosa MD   fluticasone (FLOVENT HFA) 44 MCG/ACT inhaler Inhale 2 puffs into the lungs 2 times daily  Patient not taking: Reported on 6/10/2022 6/8/22   Aureliano Sosa MD   ipratropium-albuterol (DUONEB) 0.5-2.5 (3) MG/3ML SOLN nebulizer solution Inhale 3 mLs into the lungs every 4 hours as needed for Shortness of Breath 20  Marjan Villa MD   albuterol (PROVENTIL) (2.5 MG/3ML) 0.083% nebulizer solution Take 3 mLs by nebulization every 6 hours as needed for Wheezing 20   Marjan Villa MD       Current medications:    Current Facility-Administered Medications   Medication Dose Route Frequency Provider Last Rate Last Admin    lidocaine 1 % injection 2 mL  2 mL IntraDERmal Once PRN Jaciel Brito MD        lactated ringers infusion   IntraVENous Continuous Jaciel Brito  mL/hr at 06/10/22 0923 New Bag at 06/10/22 4592       Allergies:     Allergies   Allergen Reactions    Ciprofloxacin     Levaquin [Levofloxacin]     Montelukast Itching    Oxycodone Swelling     Also rash    Pcn [Penicillins]     Prednisone Hives    Vicodin [Hydrocodone-Acetaminophen] Swelling     Also rash    171 06/03/2022       CMP:   Lab Results   Component Value Date     06/03/2022    K 3.8 06/03/2022     06/03/2022    CO2 30 06/03/2022    BUN 12 06/03/2022    CREATININE <0.5 06/03/2022    GFRAA >60 06/03/2022    AGRATIO 1.7 06/03/2022    LABGLOM >60 06/03/2022    GLUCOSE 115 06/03/2022    PROT 7.0 06/03/2022    CALCIUM 9.0 06/03/2022    BILITOT 0.3 06/03/2022    ALKPHOS 102 06/03/2022    AST 12 06/03/2022    ALT 10 06/03/2022       POC Tests: No results for input(s): POCGLU, POCNA, POCK, POCCL, POCBUN, POCHEMO, POCHCT in the last 72 hours. Coags: No results found for: PROTIME, INR, APTT    HCG (If Applicable):   Lab Results   Component Value Date    PREGTESTUR Negative 06/10/2022        ABGs: No results found for: PHART, PO2ART, UUK9XIL, KHZ7YRF, BEART, Y8CNWIMM     Type & Screen (If Applicable):  No results found for: LABABO, LABRH    Drug/Infectious Status (If Applicable):  No results found for: HIV, HEPCAB    COVID-19 Screening (If Applicable):   Lab Results   Component Value Date    COVID19 NOT DETECTED 06/03/2022    COVID19 Not Detected 10/26/2020           Anesthesia Evaluation   no history of anesthetic complications:   Airway: Mallampati: II  TM distance: >3 FB   Neck ROM: full  Mouth opening: > = 3 FB   Dental:          Pulmonary:   (+) COPD:                            ROS comment: Vocal cord polyps, LOPEZ, SOB   Cardiovascular:    (+) hyperlipidemia                  Neuro/Psych:   (+) psychiatric history:depression/anxiety             GI/Hepatic/Renal: Neg GI/Hepatic/Renal ROS            Endo/Other: Negative Endo/Other ROS                    Abdominal:             Vascular:   + DVT, . Other Findings:           Anesthesia Plan      general     ASA 3     (Pt agrees to risks, benefits and alternatives of GETA, possible need for trach. Questions answered. Willing to proceed with plan.)  Induction: intravenous.       Anesthetic plan and risks discussed with patient.                         Moustapha Renee MD   6/10/2022

## 2022-06-10 NOTE — OP NOTE
Patient Name: Gonsalo Barrera  YOB: 1970  Medical Record Number:  [id number]  Billing Number:  [id number]  Date of Procedure: 6/10/2022  Time: 1115    Pre Operative Diagnoses:  Bilateral vocal cord polyps. Post Operative Diagnoses:  Bilateral saccular cysts           Procedure:    1. Direct suspension laryngoscopy with biopsy and use of operating microscope and telescope  2. Removal of bilateral Luisana's edema (15853-86)           Surgeon: Sierra Snyder MD  Assistant: None  Anesthesia:  General anesthesia. Findings:    1) The exposure was adequate  2) The oral cavity and oropharynx appeared clear of lesions, there was no lesions or masses in the pharyx and base of tongue  3) The supraglottis appeared  clear, there were no lesions evident on the vallecula, epiglottis, arytenoids and false vocal cords  4) The pyriform sinuses and postcricoid area appeared clear without lesions  5) The glottis was partially obstructed by bilateral Luisana's edema     Indications:  Darshan Morton is a now 49 y.o. female with a history of hoarseness ongoing for the past 2 weeks. In-office laryngoscopy showed bilateral Luisana's edema, with obstruction of the laryngeal ventricle. We discussed risks, benefits and alternatives and the patient opted for surgical excision. Description: After verification of informed consent, the patient was brought to the operating room and placed in the supine position. General anesthesia was induced. The  Dedo laryngoscope with dental guard was used to expose the larynx the supraglottis and glottis as described. A thorough evaluation of the oral cavity, vallecula, base of tongue, bilateral tonsils, true and false vocal cords and bilateral pyriform sinuses was performed. The laryngoscope was suspended using a Lewy apparatus from the [facility] and images taken with a 4mm 0 and 70-degree Omar-Storz rigid telescopes. The microscope was brought to the field.  1% lidocaine with epinephrine was injected into the subepithelial layer of the right true vocal cord mucosa, and a microflap created with a sickle knife. Blunt dissection was used to free the polypoid and inflammatory tissue from the right laryngeal ventricle and superior true vocal cord, with subepithelial inflammatory tissue removed with cup forceps taking care not to injure the vocal ligament inferiorly. The process was repeated on the right side. Contents were sent for permanent histopathology. Hemostasis was obtained with 1:10,000 epinephrine and the vocal cords sprayed with atomized 4% lidocaine. Images were taken post-excision. The patient was then turned back to the care of Anesthesia to recover. The patient tolerated the procedure well and was transferred to the recovery room in stable condition. Dr. Rohan Peterson was present from the insertion to the removal of the endoscopes, and any other essential portions of the procedure, and was involved in all medical decision-making. Specimens:   ID Type Source Tests Collected by Time Destination   A : RIGHT VOCAL CORD LESION  Specimen [place] SURGICAL PATHOLOGY Lita Hall MD 6/10/2022 1012    B : LEFT VOCAL CORD LESION  Specimen [place] SURGICAL PATHOLOGY Lita Hall MD 6/10/2022 1051      Estimated Blood Loss: Minimal  Complications:  None.     Disposition/Plan:   -Voice rest x 7 days  -Cefuroxime BID x 5 days  -Smoking cessation  -Continue antireflux medication

## 2022-06-10 NOTE — H&P
complications (tracheitis, laryngo/tracheomalacia, tracheoesophageal fistula, tracheocutaneous fistula) outlined  -General anesthesia carries separate risks, to be discussed with her Anesthesiologist on the day of surgery   -Patient agrees with the above and wishes to proceed with the procedure as above  -We started her on antireflux medication, Flovent inhalers (she has started antireflux medication, will not take flovent due to anxiety)  -PCP clearance obtained 6/9/2022     History of Present Illness      Loss of voice approximately 1 week ago - significant asthenia, breathiness. Also states \"breathing is off\". States she had difficulty with inspiration in the ED 6/3/2022 and was treated with IV steroids, benadryl with improvement in symptoms. No throat pain, states neck arthritis with neck ROM occasionally. Worsens when she speaks for an extended period of time - works as a  in [employer] and speaks frequently. Smokes 1/2PPD, iced tea 1/2gal per day, states intermittent reflux symptoms. Interval history 6/10/2022: No changes to history/physical - Internal Medicine clearance obtained 6/9/2022     Past Medical History      Past Medical History        Past Medical History:   Diagnosis Date    COPD (chronic obstructive pulmonary disease) ([facility] [address]              Past Surgical History      Past Surgical History         Past Surgical History:   Procedure Laterality Date    TUBAL LIGATION                Family History      Family History         Family History   Problem Relation Age of Onset    Lung Cancer Mother      COPD Father      Diabetes type 2  Sister      Obesity Sister      Diabetes type 2  Brother      No Known Problems Brother              Social History      Social History            Tobacco Use    Smoking status: Current Every Day Smoker       Packs/day: 0.50    Smokeless tobacco: Never Used   Vaping Use    Vaping Use: Never used   Substance Use Topics    Alcohol use:  Yes       Comment: rare    Drug use: Not Currently         Allergies           Allergies   Allergen Reactions    Ciprofloxacin      Levaquin [Levofloxacin]      Montelukast Itching    Oxycodone      Pcn [Penicillins]      Prednisone Hives    Vicodin [Hydrocodone-Acetaminophen]      Zyrtec [Cetirizine] Itching    Keflex [Cephalexin] Rash         Medications      Current Facility-Administered Medications          Current Outpatient Medications   Medication Sig Dispense Refill    omeprazole (PRILOSEC) 20 MG delayed release capsule Take 1 capsule by mouth 2 times daily (before meals) 180 capsule 1    fluticasone (FLOVENT HFA) 44 MCG/ACT inhaler Inhale 2 puffs into the lungs 2 times daily 10.6 g 3    albuterol sulfate HFA (PROAIR HFA) 108 (90 Base) MCG/ACT inhaler Inhale 2 puffs into the lungs every 6 hours as needed for Wheezing 3 each 0    albuterol (PROVENTIL) (2.5 MG/3ML) 0.083% nebulizer solution Take 3 mLs by nebulization every 6 hours as needed for Wheezing 120 each 2    Omega-3 Fatty Acids (FISH OIL) 1000 MG CAPS Take 3,000 mg by mouth 3 times daily        atorvastatin (LIPITOR) 20 MG tablet Take 1 tablet by mouth daily (Patient not taking: Reported on 6/8/2022) 90 tablet 0    erythromycin (ROMYCIN) 5 MG/GM ophthalmic ointment Apply one centimeter ribbon to lower lid, of both eyes, 3x a day for 7-10 days. No refills (Patient not taking: Reported on 6/8/2022) 1 g 0    ipratropium-albuterol (DUONEB) 0.5-2.5 (3) MG/3ML SOLN nebulizer solution Inhale 3 mLs into the lungs every 4 hours as needed for Shortness of Breath 1620 mL 0      No current facility-administered medications for this visit.            Review of Systems      Review of Systems   Constitutional: Negative for activity change, chills, diaphoresis, fatigue and fever. HENT: Positive for sore throat and voice change. Negative for congestion, hearing loss, rhinorrhea, sinus pressure and sinus pain. Eyes: Negative for visual disturbance. Respiratory: Negative for shortness of breath and wheezing. Cardiovascular: Negative for chest pain. Gastrointestinal: Negative for abdominal pain. Musculoskeletal: Negative for neck pain and neck stiffness. Skin: Negative for rash. Neurological: Negative for dizziness, light-headedness and headaches. Hematological: Negative for adenopathy.      PhysicalExam      Vitals       Vitals:     06/08/22 1333   BP: 120/64   Site: Right Upper Arm   Position: Sitting   Pulse: 72   Temp: 97.7 °F (36.5 °C)   TempSrc: Infrared   Weight: 166 lb 12.8 oz (75.7 kg)   Height: 5' 8\" (1.727 m)            Physical Exam  Vitals reviewed. Constitutional:       Appearance: Normal appearance. Comments: Significant hoarseness, asthenia -no shayy stridor    HENT:      Head: Normocephalic and atraumatic. Right Ear: Tympanic membrane, ear canal and external ear normal. There is no impacted cerumen. Left Ear: Tympanic membrane, ear canal and external ear normal. There is no impacted cerumen. Ears:      Hathaway exam findings: does not lateralize. Right Rinne: AC > BC. Left Rinne: AC > BC. Nose: No congestion or rhinorrhea. Mouth/Throat:      Lips: Pink. No lesions. Mouth: Mucous membranes are moist. No oral lesions. Tongue: No lesions. Tongue does not deviate from midline. Palate: No mass and lesions. Pharynx: Oropharynx is clear. Uvula midline. No oropharyngeal exudate or posterior oropharyngeal erythema. Eyes:      Extraocular Movements: Extraocular movements intact. Pupils: Pupils are equal, round, and reactive to light. Musculoskeletal:      Cervical back: Normal range of motion and neck supple. Lymphadenopathy:      Cervical: No cervical adenopathy. Neurological:      Mental Status: She is alert.       Cranial Nerves: No cranial nerve deficit.             Data Review          Procedure      Flexible Laryngoscopy (from prior clinic visit)     Pre op: Worsenings, breathiness,. Post op: Large vocal cord polyposis bilateral  Procedure : Flexible Nasopharyngolaryngoscopy  Surgeon: SELAM Do  Anesthesia: Afrin with 2% lidocaine  Estimated Blood Loss: None     Procedure:   Flexible Laryngoscopy      After obtaining consent, the patient was placed in the examination chair in the upright position. Decongestant and topical anesthetic was sprayed in the After allowing adequate time for hemostatic effect, the flexible 4 mm laryngoscope was passed via the bilateral nasal passages     Nasal Septum: No acute septal deviation, no septal hematoma or perforations noted   Nasal Findings: No active hemorrhage, no nasal masses appreciated   Nasopharynx: Clear, no masses or inflammation  Oropharynx: Bilateral tonsillar tissue absent, no exophytic masses  Base of Tongue: Lingual tonsils within normal limits, vallecula without effacement  Epiglottis: Upright, in normal anatomical position  True Vocal Cord: Bilateral vocal cord polyps, enlarged, with obstruction of the alycia glottitis  False Vocal Cord: normal   Hypopharynx Mucosa: No masses or inflammation of the piriform sinuses or postcricoid area  Arytenoids: Normal mucosa, no dislocation appreciated      * Patient tolerated the procedure well with no complications   * Patient was instructed not to eat for 30 minutes following procedure.    * Patient was instructed that they may notice minor bleeding.     Attestation:   I was present for the entire viewing, including introduction and removal of the scope.          Sepideh Encarnacion MD

## 2022-06-10 NOTE — ANESTHESIA POSTPROCEDURE EVALUATION
Department of Anesthesiology  Postprocedure Note    Patient: Shayan Howell  MRN: [id number]  YOB: 1970  Date of evaluation: 6/10/2022  Time:  4:21 PM     Procedure Summary     Date: 06/10/22 [place] / Location: [place] [address] / [facility]    Anesthesia Start: Lj Sane Anesthesia Stop: 0763    Procedure: DIRECT LARYNGOSCOPY MICROSCOPE WITH REMOVAL OF BILATERAL VOCAL CORD LESIONS (N/A Throat) Diagnosis:       Vocal cord polyps      (BILATERAL VOCAL CORD POLYPS)    Surgeons: Rafael Posadas MD Responsible Provider: Barbara Sandoval MD    Anesthesia Type: general ASA Status: 3          Anesthesia Type: No value filed. Ernesto Phase I: Ernesto Score: 10    Ernesto Phase II: Ernesto Score: 10    Last vitals: Reviewed and per EMR flowsheets. Anesthesia Post Evaluation    Patient location during evaluation: PACU  Patient participation: complete - patient participated  Level of consciousness: awake and alert  Airway patency: patent  Nausea & Vomiting: no nausea and no vomiting  Complications: no  Cardiovascular status: blood pressure returned to baseline  Respiratory status: acceptable  Hydration status: euvolemic  Comments: VSS on transfer to phase 2 recovery. No anesthetic complications.

## 2022-06-13 ENCOUNTER — TELEPHONE (OUTPATIENT)
Dept: ENT CLINIC | Age: 52
End: 2022-06-13

## 2022-06-13 NOTE — TELEPHONE ENCOUNTER
Patient's  Yelitza Silva called in to ask  when patient can have caffeine?     Patient had surgery on 6/10/22  DIRECT LARYNGOSCOPY MICROSCOPE WITH EXCISION OF TUMOR, POSSIBLE [[id number]]  TRACHEOSTOMY TUBE PLACEMENT    Patient call back [phone]

## 2022-06-13 NOTE — TELEPHONE ENCOUNTER
Spoke to patient's  Reema Robertson that patient should avoid coffee for 1 week after surgery.   Expressed understanding

## 2022-06-15 ENCOUNTER — TELEPHONE (OUTPATIENT)
Dept: ENT CLINIC | Age: 52
End: 2022-06-15

## 2022-06-15 NOTE — TELEPHONE ENCOUNTER
Called patient with results of pathology-dysplasia noted, no shayy malignancy. She is hoarse but voice is significantly improved from postoperative sound, have asked her to continue voice rest, strongly recommended she stop smoking, we will see her back in 1 week for a postoperative appointment.

## 2022-06-22 ENCOUNTER — OFFICE VISIT (OUTPATIENT)
Dept: ENT CLINIC | Age: 52
End: 2022-06-22
Payer: OTHER GOVERNMENT

## 2022-06-22 VITALS — WEIGHT: 166 LBS | BODY MASS INDEX: 25.16 KG/M2 | HEIGHT: 68 IN | TEMPERATURE: 98.1 F

## 2022-06-22 DIAGNOSIS — R49.0 HOARSENESS OF VOICE: Primary | ICD-10-CM

## 2022-06-22 DIAGNOSIS — Q31.9 DYSPLASIA OF LARYNX: ICD-10-CM

## 2022-06-22 PROCEDURE — 31575 DIAGNOSTIC LARYNGOSCOPY: CPT | Performed by: OTOLARYNGOLOGY

## 2022-06-22 ASSESSMENT — ENCOUNTER SYMPTOMS
ABDOMINAL PAIN: 0
SINUS PAIN: 0
SHORTNESS OF BREATH: 0
RHINORRHEA: 0
VOICE CHANGE: 1
SINUS PRESSURE: 0
WHEEZING: 0
SORE THROAT: 1

## 2022-06-22 NOTE — PATIENT INSTRUCTIONS
-Continue antireflux medications for another 3 months  -Drink 1.5-2L of water daily  -Cut down tea, coffee and any caffeinated beverages  -Attempt to quit smoking

## 2022-06-22 NOTE — LETTER
[facility]  [address] [place] [address]  Phone: [phone]  Fax: [phone]    Candida Ace MD        June 22, 2022     Patient: Elva Haro   YOB: 1970   Date of Visit: 6/22/2022       To Whom It May Concern: It is my medical opinion that Elva Haro should be excused from work from 6/8/2022 to 6/22/2022 - she may return to work on 6/23/2022 and is to wear her voice amplifier to prevent voice strain. If you have any questions or concerns, please don't hesitate to call.     Sincerely,          Candida Ace MD

## 2022-06-22 NOTE — PROGRESS NOTES
[address], [address], [address] [place] [address]  P: [phone]       Patient     Edward Zamudio  1970    Chief Concern     Chief Complaint   Patient presents with    Post-Op Check     Patient is here for her post op check up. Patient states she is much better. She states her voice is changing and she is still having trouble breathing out her nose. Patient does have poison oak on her face neck, and chest. She also states she has face swelling. Assessment and Plan      Diagnosis Orders   1. Hoarseness of voice  [facility] [address], [place] - Speech Therapy - [place]   2. Dysplasia of larynx  [facility], SLP - Speech Therapy - [place]     26-year-old female with 1 week of hoarse voice, on examination we appreciate large bilateral vocal cord polyps surgical pathology identified moderate squamous dysplasia bilaterally, no fungal elements, no shayy carcinoma. We have asked her to continue her omeprazole, increase hydration, quit smoking concerned that malignancy may develop. She has significant hoarseness however is only 10 days postoperative at this time, and we will refer her to speech-language pathology for voice therapy. Return in about 3 months (around 9/22/2022). History of Present Illness     Loss of voice approximately 1 week ago - significant asthenia, breathiness. Also states \"breathing is off\". States she had difficulty with inspiration in the ED 6/3/2022 and was treated with IV steroids, benadryl with improvement in symptoms. No throat pain, states neck arthritis with neck ROM occasionally. Worsens when she speaks for an extended period of time - works as a  in [employer] and speaks frequently. Smokes 1/2PPD, iced tea 1/2gal per day, states intermittent reflux symptoms.      Past Medical History     Past Medical History:   Diagnosis Date    Anxiety     COPD (chronic obstructive pulmonary disease) ([facility] [address]     Hx of blood clots 1999    Bilateral DVTs years ago;  No anticoagulants currently    Hyperlipidemia     No medications currently       Past Surgical History     Past Surgical History:   Procedure Laterality Date    DILATION AND CURETTAGE OF UTERUS      LARYNGOSCOPY N/A 6/10/2022    DIRECT LARYNGOSCOPY MICROSCOPE WITH REMOVAL OF BILATERAL VOCAL CORD LESIONS performed by Marni Arreguin MD at [facility]      TUBAL LIGATION         Family History     Family History   Problem Relation Age of Onset    Lung Cancer Mother     COPD Father     Diabetes type 2  Sister     Obesity Sister     Diabetes type 2  Brother     No Known Problems Brother        Social History     Social History     Tobacco Use    Smoking status: Current Every Day Smoker     Packs/day: 0.50    Smokeless tobacco: Never Used   Vaping Use    Vaping Use: Never used   Substance Use Topics    Alcohol use: Yes     Comment: rare    Drug use: Not Currently        Allergies     Allergies   Allergen Reactions    Ciprofloxacin     Levaquin [Levofloxacin]     Montelukast Itching    Oxycodone Swelling     Also rash    Pcn [Penicillins]     Prednisone Hives    Vicodin [Hydrocodone-Acetaminophen] Swelling     Also rash    Zyrtec [Cetirizine] Itching    Keflex [Cephalexin] Rash       Medications     Current Outpatient Medications   Medication Sig Dispense Refill    ondansetron (ZOFRAN-ODT) 4 MG disintegrating tablet Take 1 tablet by mouth 3 times daily as needed for Nausea or Vomiting 21 tablet 0    acetaminophen (TYLENOL) 500 MG tablet Take 1 tablet by mouth 4 times daily as needed for Pain 120 tablet 0    ibuprofen (ADVIL;MOTRIN) 600 MG tablet Take 1 tablet by mouth 4 times daily as needed for Pain 120 tablet 0    albuterol sulfate HFA (PROAIR HFA) 108 (90 Base) MCG/ACT inhaler Inhale 2 puffs into the lungs every 6 hours as needed for Wheezing 3 each 0    omeprazole (PRILOSEC) 20 MG delayed release capsule Take 1 capsule by mouth 2 times daily (before meals) 180 capsule 1    fluticasone (FLOVENT HFA) 44 MCG/ACT inhaler Inhale 2 puffs into the lungs 2 times daily (Patient not taking: Reported on 6/10/2022) 10.6 g 3    ipratropium-albuterol (DUONEB) 0.5-2.5 (3) MG/3ML SOLN nebulizer solution Inhale 3 mLs into the lungs every 4 hours as needed for Shortness of Breath 1620 mL 0    albuterol (PROVENTIL) (2.5 MG/3ML) 0.083% nebulizer solution Take 3 mLs by nebulization every 6 hours as needed for Wheezing 120 each 2     No current facility-administered medications for this visit. Review of Systems     Review of Systems   Constitutional: Negative for activity change, chills, diaphoresis, fatigue and fever. HENT: Positive for sore throat and voice change. Negative for congestion, hearing loss, rhinorrhea, sinus pressure and sinus pain. Eyes: Negative for visual disturbance. Respiratory: Negative for shortness of breath and wheezing. Cardiovascular: Negative for chest pain. Gastrointestinal: Negative for abdominal pain. Musculoskeletal: Negative for neck pain and neck stiffness. Skin: Negative for rash. Neurological: Negative for dizziness, light-headedness and headaches. Hematological: Negative for adenopathy. PhysicalExam     Vitals:    06/22/22 1039   Temp: 98.1 °F (36.7 °C)   TempSrc: Infrared   Weight: 166 lb (75.3 kg)   Height: 5' 8\" (1.727 m)       Physical Exam  Vitals reviewed. Constitutional:       Appearance: Normal appearance. Comments: Significant hoarseness, asthenia -no shayy stridor    HENT:      Head: Normocephalic and atraumatic. Right Ear: Tympanic membrane, ear canal and external ear normal. There is no impacted cerumen. Left Ear: Tympanic membrane, ear canal and external ear normal. There is no impacted cerumen. Ears:      Hathaway exam findings: does not lateralize. Right Rinne: AC > BC. Left Rinne: AC > BC. Nose: No congestion or rhinorrhea. Mouth/Throat:      Lips: Pink. No lesions. Mouth: Mucous membranes are moist. No oral lesions. Tongue: No lesions. Tongue does not deviate from midline. Palate: No mass and lesions. Pharynx: Oropharynx is clear. Uvula midline. No oropharyngeal exudate or posterior oropharyngeal erythema. Eyes:      Extraocular Movements: Extraocular movements intact. Pupils: Pupils are equal, round, and reactive to light. Musculoskeletal:      Cervical back: Normal range of motion and neck supple. Lymphadenopathy:      Cervical: No cervical adenopathy. Neurological:      Mental Status: She is alert. Cranial Nerves: No cranial nerve deficit. Data Review        Procedure     Flexible Laryngoscopy    Pre op: Worsenings, breathiness,. Post op: Large vocal cord polyposis bilateral  Procedure : Flexible Nasopharyngolaryngoscopy  Surgeon: SELAM Do  Anesthesia: Afrin with 2% lidocaine  Estimated Blood Loss: None    Procedure:   Flexible Laryngoscopy     After obtaining consent, the patient was placed in the examination chair in the upright position.   Decongestant and topical anesthetic was sprayed in the After allowing adequate time for hemostatic effect, the flexible 4 mm laryngoscope was passed via the bilateral nasal passages    Nasal Septum: No acute septal deviation, no septal hematoma or perforations noted   Nasal Findings: No active hemorrhage, no nasal masses appreciated   Nasopharynx: Clear, no masses or inflammation  Oropharynx: Bilateral tonsillar tissue absent, no exophytic masses  Base of Tongue: Lingual tonsils within normal limits, vallecula without effacement  Epiglottis: Upright, in normal anatomical position  True Vocal Cord: Bilateral vocal cord cords with epithelial injury  False Vocal Cord: normal   Hypopharynx Mucosa: No masses or inflammation of the piriform sinuses or postcricoid area  Arytenoids: Normal mucosa, no dislocation appreciated     * Patient tolerated the

## 2022-06-22 NOTE — Clinical Note
Zee Son all is well-I am sending this patient to you for voice therapy after removal of laryngeal ventricle masses. She has before and after pictures in my note; she presented to me with severe hoarseness and polypoid masses that were nearly obstructing the endolarynx. Intraoperatively I found that these were coming from the ventricle, and surgical pathology showed moderate squamous dysplasia bilaterally. She is a current smoker, and have asked her to cut down on her tobacco use, have started her on antireflux medications, and have asked her to increase hydration. I am worried that there might be epithelial damage to the vocal cords given the degree of resection-please let me know what you find.   I think she will do well with voice therapy, but the main thing is to get her to quit smoking    Best,  NK

## 2022-06-30 ENCOUNTER — OFFICE VISIT (OUTPATIENT)
Dept: INTERNAL MEDICINE CLINIC | Age: 52
End: 2022-06-30

## 2022-06-30 VITALS
SYSTOLIC BLOOD PRESSURE: 136 MMHG | HEART RATE: 72 BPM | BODY MASS INDEX: 26.07 KG/M2 | HEIGHT: 68 IN | RESPIRATION RATE: 12 BRPM | WEIGHT: 172 LBS | DIASTOLIC BLOOD PRESSURE: 80 MMHG

## 2022-06-30 DIAGNOSIS — E78.5 HYPERLIPIDEMIA, UNSPECIFIED HYPERLIPIDEMIA TYPE: ICD-10-CM

## 2022-06-30 DIAGNOSIS — R49.0 HOARSENESS OF VOICE: ICD-10-CM

## 2022-06-30 DIAGNOSIS — J44.9 CHRONIC OBSTRUCTIVE PULMONARY DISEASE, UNSPECIFIED COPD TYPE (HCC): Primary | ICD-10-CM

## 2022-06-30 LAB
CHOLESTEROL, TOTAL: 246 MG/DL (ref 0–199)
HDLC SERPL-MCNC: 51 MG/DL (ref 40–60)
LDL CHOLESTEROL CALCULATED: 181 MG/DL
TRIGL SERPL-MCNC: 70 MG/DL (ref 0–150)
VLDLC SERPL CALC-MCNC: 14 MG/DL

## 2022-06-30 PROCEDURE — 99213 OFFICE O/P EST LOW 20 MIN: CPT | Performed by: INTERNAL MEDICINE

## 2022-06-30 RX ORDER — NICOTINE 21 MG/24HR
1 PATCH, TRANSDERMAL 24 HOURS TRANSDERMAL EVERY 24 HOURS
Qty: 90 PATCH | Refills: 0 | Status: SHIPPED | OUTPATIENT
Start: 2022-06-30 | End: 2022-09-28

## 2022-06-30 ASSESSMENT — ENCOUNTER SYMPTOMS
NAUSEA: 0
ABDOMINAL PAIN: 0
WHEEZING: 0
VOMITING: 0
RHINORRHEA: 0
SHORTNESS OF BREATH: 0

## 2022-06-30 NOTE — PROGRESS NOTES
Subjective:      Patient ID: Josue River is a 49 y.o. female. HPI     Patient is here for follow up. She has a history of COPD. She is a smoker. She has not done her PFT. I have ordered it and discussed it with her. She had vocal cord polyps removed. It was pre cancerous. She is due to see the Speech therapist soon. She denies any chest pain. She denies any GI issues. Review of Systems   Constitutional: Negative for appetite change and fatigue. HENT: Negative for postnasal drip and rhinorrhea. Respiratory: Negative for shortness of breath and wheezing. Cardiovascular: Negative for chest pain and palpitations. Gastrointestinal: Negative for abdominal pain, nausea and vomiting. Musculoskeletal: Negative for joint swelling. Skin: Negative for rash. Neurological: Negative for light-headedness. Psychiatric/Behavioral: Negative for sleep disturbance. Past Medical History:   Diagnosis Date    Anxiety     [facility]ch[facility] obstructive pulmonary disease) ([facility] [address]     Hx of blood clots 1999    Bilateral DVTs years ago;  No anticoagulants currently    Hyperlipidemia     No medications currently       Past Surgical History:   Procedure Laterality Date    DILATION AND CURETTAGE OF UTERUS      LARYNGOSCOPY N/A 6/10/2022    DIRECT LARYNGOSCOPY MICROSCOPE WITH REMOVAL OF BILATERAL VOCAL CORD LESIONS performed by Bar[facility] Florez [facility] at [facility]         Family History   Problem Relation Age of Onset    Lung Cancer Mother     COPD Father     Diabetes type 2  Sister     Obesity Sister     Diabetes type 2  Brother     No Known Problems Brother        Social History     Tobacco Use    Smoking status: Current Every Day Smoker     Packs/day: 0.50    Smokeless tobacco: Never Used   Vaping Use    Vaping Use: Never used   Substance Use Topics    Alcohol use: Yes     Comment: rare    Drug use: Not Currently       /80 (Site: Right Upper Arm, Position: Sitting, Cuff Size: Medium Adult)   Pulse 72   Resp 12   Ht 5' 8\" (1.727 m)   Wt 172 lb (78 kg)   BMI 26.15 kg/m²       Objective:   Physical Exam  Constitutional:       Appearance: She is well-developed. HENT:      Head: Normocephalic. Eyes:      Conjunctiva/sclera: Conjunctivae normal.      Pupils: Pupils are equal, round, and reactive to light. Neck:      Thyroid: No thyroid mass or thyromegaly. Vascular: No carotid bruit or JVD. Trachea: Trachea normal.   Cardiovascular:      Rate and Rhythm: Normal rate and regular rhythm. Heart sounds: Normal heart sounds. No murmur heard. No gallop. Pulmonary:      Effort: Pulmonary effort is normal. No respiratory distress. Breath sounds: Normal breath sounds. No wheezing or rales. Chest:      Comments: Mass left lower chest/upper abdomen  Abdominal:      General: Bowel sounds are normal. There is no distension. Palpations: Abdomen is soft. There is no hepatomegaly, splenomegaly or mass. Tenderness: There is no abdominal tenderness. Comments: Soft tissue mass on the left   Musculoskeletal:         General: Normal range of motion. Cervical back: Normal range of motion and neck supple. Lymphadenopathy:      Cervical: No cervical adenopathy. Skin:     General: Skin is warm and dry. Findings: No rash. Neurological:      Mental Status: She is alert and oriented to person, place, and time. Cranial Nerves: No cranial nerve deficit. Deep Tendon Reflexes: Reflexes are normal and symmetric. Psychiatric:         Behavior: Behavior normal.         Thought Content:  Thought content normal.         Judgment: Judgment normal.       Path report vocal cord polyps 6/10/22  Technical processing at [facility], .. [address] [place] [address]  Phone [phone]     FINAL DIAGNOSIS:     A.  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 - PAS stain is negative for fungal elements. B.  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 - PAS stain is negative for fungal elements. COMMENT:    Both specimens show inflamed squamous mucosa with epithelial   atypia and ulceration.  Multiple foci show features consistent with   moderate squamous dysplasia.  No evidence of malignancy is present. Clinical correlation is recommended.     BUCCA/BUCCA     Assessment:       Diagnosis Orders   1. Chronic obstructive pulmonary disease, unspecified COPD type ([facility] [address]     2. Hyperlipidemia, unspecified hyperlipidemia type  Lipid Panel   3. Hoarseness of voice            Plan:      #  COPD. She has not done her PFT. We have discussed this many times. Doing ok. #  She refused colonoscopy. FIT negative. #  HLD. Elevated. On diet and herbal stuff. Check labs. # Has some hoarseness. Vocal cord polyps removed. They are pre cancerous    #  Counselled to stop smoking. Nicotine patch ordered.                 Homero Julian MD

## 2022-07-06 ENCOUNTER — TELEPHONE (OUTPATIENT)
Dept: INTERNAL MEDICINE CLINIC | Age: 52
End: 2022-07-06

## 2022-07-06 DIAGNOSIS — E78.5 HYPERLIPIDEMIA, UNSPECIFIED HYPERLIPIDEMIA TYPE: Primary | ICD-10-CM

## 2022-07-06 RX ORDER — ATORVASTATIN CALCIUM 20 MG/1
20 TABLET, FILM COATED ORAL DAILY
Qty: 90 TABLET | Refills: 0 | Status: SHIPPED | OUTPATIENT
Start: 2022-07-06

## 2022-07-06 NOTE — TELEPHONE ENCOUNTER
----- Message from Giulia Bonner MD sent at 7/5/2022  6:19 PM EDT -----  Start Lipitor 20 mg daily  Check liver and lipids in six weeks

## 2022-07-12 ENCOUNTER — PROCEDURE VISIT (OUTPATIENT)
Dept: SPEECH THERAPY | Age: 52
End: 2022-07-12
Payer: OTHER GOVERNMENT

## 2022-07-12 DIAGNOSIS — R49.0 HOARSENESS OF VOICE: ICD-10-CM

## 2022-07-12 DIAGNOSIS — J38.1 REINKE'S EDEMA OF VOCAL FOLDS: ICD-10-CM

## 2022-07-12 DIAGNOSIS — R49.0 DYSPHONIA: ICD-10-CM

## 2022-07-12 PROCEDURE — 92524 BEHAVRAL QUALIT ANALYS VOICE: CPT | Performed by: SPEECH-LANGUAGE PATHOLOGIST

## 2022-07-12 PROCEDURE — 31579 LARYNGOSCOPY TELESCOPIC: CPT | Performed by: SPEECH-LANGUAGE PATHOLOGIST

## 2022-07-12 PROCEDURE — 92507 TX SP LANG VOICE COMM INDIV: CPT | Performed by: SPEECH-LANGUAGE PATHOLOGIST

## 2022-07-12 NOTE — PROGRESS NOTES
[facility] ([facility]) ENT  Videostroboscopic Examination of the Larynx    BACKGROUND HISTORY:  Pt w/ hx of MDL and removal of bilateral TVF polyps 6/10/22; seen one-month post-op this date for initial voice evaluation. Pt wearing amplification device d/t vocal quality fluctuating t/o the day and perceived strain w/ extended use. Pt has high vocal load; currently works as a . Endorsed feeling fatigue throughout larynx that becomes more prominent towards the end of day however, denied pain; endorsed hx of cervical tension d/t herniated discs. Pt is currently smoking 10 cigarettes a day; continued nicotine use post-operatively. Receiving nicotine patches this week to assist w/ cessation. Denied dyspnea however, feels she has trouble breathing through nose; hx of COPD. Denied dysphagia. Pt endorsed hx of acid reflux; currently on 20 mg of Prilosec per ENT but not taking BID as instructed. Surgical/Medical History: See the above. Hydration: >64 oz  Smoking History: 10 cigarettes daily   Caffeine Intake: none; stopped sweet tea post-operative    PER ENT NOTE, Dr. Jesus Chen, 6/22/22: We have asked her to continue her omeprazole, increase hydration, quit smoking- concerned that malignancy may develop. She has significant hoarseness however is only 10 days postoperative at this time, and we will refer her to speech-language pathology for voice therapy. Perceptual Quality: Pt presented with moderate-severe dysphonia characterized by roughness, breathiness, intermittent diplophonia, and instances of significant strain/pressed phonation. Acoustic Analysis:  Maximum Sustained Phonation- 8 seconds (aphonic for 3)  Avg Hz- 230  Max Hz- 323  Min Hz- 120    Flexible Stroboscopy Laryngoscopy  Procedure : Flexible Stroboscopy Laryngoscopy  Performed by: Lesa Hou, SLP  Anesthesia: Afrin with 4% lidocaine  Description:  The scope was passed along the floor of the right naris to the level of the larynx.  There was no evidence of concerning masses or lesions of the base of tongue, vallecula, epiglottis, aryepiglottic folds, arytenoids, false vocal folds, true vocal folds, or pyriform sinuses. The scope was removed. The patient tolerated the procedure without difficulty. There were no complications. Pertinent findings: See Assessment and Plan of Care                  ASSESSMENT AND PLAN OF CARE:   47 y.o. female w/ hx of MDL and removal of bilateral TVF polyps 6/10/22. VLS revealed bilateral edema and erythema of TVFs with irregularities of medial edge, consistent changes for recent surgical intervention. Glottic closure characterized as complete and irregular; mild-moderately reduced superior-inferior wave of anterior RTVF resulting in mildly irregular periodicity. Functional movement of LTVF. Inconsistent twitching of the R arytenoid, specifically during abduction. Compensatory supraglottic compression was noted (LM>AP) w/ intermittent false fold phonation/growl. Moderate interarytenoid pachydermia and posterior edema, erythema and thick mucus were observed, indicative of laryngopharyngeal reflux. Subglottis was patent without evidence of narrowing. No mass lesions, paresis or paralysis was noted. EDUCATION AND THERAPY:  Reviewed VLS w/ pt; educated to post-operative changes of the TVFs and the presence of compensatory laryngeal tension. Discussed typical time-frame of rehabilitation of the voice and healing of the TVFs, including >6-8 weeks. Educated pt on the impact of smoking post-operatively, including negatively impacting healing process and possible reoccurrence of polyps. Recommended pt to continue the use of amplification device to reduce additional strain of voicing. Introduced pt to [employer] via strawflow which pt was able to demonstrate w/ forward focused phonation given min cues to use \"do\" for coordination of respiration/phonation. Able to produce ascending/decending phonation with clear vocal quality.  Pt feels comfortable completing exercises independently and is able to differentiate forward focused sensation (vibration on lips) vs back pressed phonation. Overall, pt in understanding w/ all the above and prognosis is fair w/ implementation of recommendations and adherence to POC. PATIENT GOALS:  Pt will adhere to vocal hygiene protocol during daily life activities w/ 80% acc given mod cues  Pt will adhere to reflux management protocol during daily life activities w/ 80% acc given mod cues  Pt will perform SOVT techniques during various voicing tasks w/ 80% acc given mod cues  Pt will perform RVT techniques during various voicing tasks w/ 80% acc given mod cues  Pt will perform resistive/abdominal breathing exercises at rest and during phonation w/ 80% acc given mod cues  Pt will complete laryngeal/general relaxation stretches/exercises w/ 80% acc given mod cues    SLP recommended: Yes  Barriers to treatment: Ongoing smoking  Potential benefits from rehab include: Improved vocal quality  Frequency: 4 sessions over 6-8/wk  Prognosis is: Fair    RECOMMENDATIONS:   1. Dr. Osmar Mejia was present and reviewed recorded evaluation to assist in diagnosis and provide assessment and plan for treatment. 2. Follow good vocal hygiene behaviors and precautions, including increasing oral hydration. 3. Follow dietary precautions and behavioral lifestyle changes regarding laryngopharyngeal reflux, including taking PPI as prescribed by physician. 4. Voice therapy to focus on re-strengthening and balancing the laryngeal musculature, to promote to open oral front focus, to promote using adequate diaphragmatic breath support to sustain conversational speech. 5. Pt is a good candidate for further medical evaluation/intervention at the discretion of the treating physician. 6. Pt to follow up with ENT/Dr. Osmar Mejia.       CPT Code Units Billed Time Billed Today Date of POC Start Re-Certification Date Referring Provider   [id number], [id number], [id number], [id number] 4 Unit Time in: 1250  Time out: 1340  Total time: 50 min 7/12/2022 60 days Dr. Roscoe Mcmahan       Thank you,    Deyanira Kaur) [place], [place], Angelika Carrizales; [phone]  Voice Specialized Speech-Language Pathologist

## 2022-07-12 NOTE — PATIENT INSTRUCTIONS
Bubbles  -Continuous air (bubbles)  -Gentle voicing (hum)    -Think \"do\"  -\"buzz\" on the lips; relaxed throat   -Think nice & easy  -Focus on sensation, not sound! 1. Straight  2. Up  3. Down  4.  [place]    -10 minutes daily, but 3-5 minutes at a time   -Before and after work   -Warm-up and cool-down

## 2022-08-16 ENCOUNTER — PROCEDURE VISIT (OUTPATIENT)
Dept: SPEECH THERAPY | Age: 52
End: 2022-08-16
Payer: OTHER GOVERNMENT

## 2022-08-16 DIAGNOSIS — J38.4 VOCAL CORD EDEMA: ICD-10-CM

## 2022-08-16 DIAGNOSIS — R49.0 MUSCLE TENSION DYSPHONIA: ICD-10-CM

## 2022-08-16 DIAGNOSIS — R49.0 DYSPHONIA: Primary | ICD-10-CM

## 2022-08-16 PROCEDURE — 92507 TX SP LANG VOICE COMM INDIV: CPT | Performed by: SPEECH-LANGUAGE PATHOLOGIST

## 2022-08-16 NOTE — PROGRESS NOTES
[facility]) ENT  Voice Therapy      Pt Seen for Therapy - Session # 2     Diagnosis/Indication:   Primary: Dysphonia  Secondary: TVF polyps  Tertiary: MTD/LPR    Background History:   Pt w/ hx of MDL and removal of bilateral TVF polyps 6/10/22; seen one-month post-op this date for initial voice evaluation. Pt wearing amplification device d/t vocal quality fluctuating t/o the day and perceived strain w/ extended use. Pt has high vocal load; currently works as a . Endorsed feeling fatigue throughout larynx that becomes more prominent towards the end of day however, denied pain; endorsed hx of cervical tension d/t herniated discs. Pt is currently smoking 10 cigarettes a day; continued nicotine use post-operatively. Receiving nicotine patches this week to assist w/ cessation. Denied dyspnea however, feels she has trouble breathing through nose; hx of COPD. Denied dysphagia. Pt endorsed hx of acid reflux; currently on 20 mg of Prilosec per ENT but not taking BID as instructed. Previous SLP Evaluations: 7/12/22  VLS revealed bilateral edema and erythema of TVFs with irregularities of medial edge, consistent changes for recent surgical intervention. Glottic closure characterized as complete and irregular; mild-moderately reduced superior-inferior wave of anterior RTVF resulting in mildly irregular periodicity. Functional movement of LTVF. Inconsistent twitching of the R arytenoid, specifically during abduction. Compensatory supraglottic compression was noted (LM>AP) w/ intermittent false fold phonation/growl. SUBJECTIVE:   Pt w/ moderate improvement in vocal quality since initial eval; volume/strength has returned and no longer requires use of personal amplification. Feels modal pitch is elevated and having difficulties reaching lower ranges; inconsistent vocal quality w/ intermittent pitch breaks and reduced sensation of control. Continues to smoke.     OBJECTIVE:   Voice Therapy    Goal: Session 2 Session 3 Session 4   Pt will adhere to vocal hygiene protocol during daily life activities w/ 80% acc   Pt adhered to vocal hygiene protocol w/ 65% acc    Endorsed intermittent completion of exercises d/t experiencing increased personal stress. Looking to transition into different job to assist.     Continues to smoke; attempted patch which increased smoking habits. Stopped using at this time. Ongoing encouragement for smoking cessation. Educated to changes in vocal quality (ie elevated speaking pitch) d/t removal of mass/weight on TVFs. Counseled that this is most likely modal speaking pitch, and voice always lower d/t polypoid changes. Expressed understanding. Pt will adhere to reflux management protocol during daily life activities w/ 80% acc   Pt adhered to reflux management protocol w/ 70% acc    Continues to take reflux medication; cut out caffeine to assist.        Pt will perform SOVT techniques during various voicing tasks w/ 80% acc    Pt performed SOVT techniques via    Lips drills w/ sustained pitch and ascending/descending glides w/ 70% acc; required min cues for consistent airflow, specifically to support phonation in lower ranges. Voice remained clear t/o and noted to have intermittent pushing; encouraged to focus on relaxed posture. Pt will perform gentle onset techniques during various voicing tasks w/ 80% acc Pt performed gentle onset techniques via    F->V w/ 65% acc; initial difficulties completing sustained airflow prior to turning on voice. Utilized tissue for visual feedback to sustain breath support. Noted to strain during initial onset; cues for relaxation of focus on air assisted. Able to produce light, relaxed voicing and endorsed no discomfort. ASSESSMENT:      47 y.o. female w/ hx of bilateral TVF polyps w/ removal 6/10/22. Post-operatively, pt feels voice has continued to improve and now able to speak w/ mild roughness.  Greatest concerns related to elevated speaking pitch, difficulties in lower ranges, and inconsistent pitch breaks. Educated to irregularities of Vfs and direct impact on vocal quality; discussed elevated pitch d/t removal of weight/mass. Introduced to [employer] d/t complaint of cervical tension; able to complete w/ min cues for consistent airflow, but achieved clear voicing. Transitioned into easy onset techniques (F->V) to promote decreased laryngeal tension w/ phonation; initial difficulties turning voice on w/o strain, but able to perform light voicing w/ multiple trials. Overall, pt is progressing towards goals and prognosis remains good w/ adherence to recommendations.      RECOMMENDATIONS:      Follow HEP and RTC for ongoing VOT  2.   RTC in 3 weeks    CPT Code Units Billed Time Billed Today Date of POC Start Re-Certification Date Referring Provider   22446 1 Unit Time in: 6204  Time out: 1000  Total time: 40 min 7/12/22 60 days Dr. Jesus Chen       Thank you,    Archie Prier) [place], [place], [address] [facility]; [phone]  Voice Specialized Speech-Language Pathologist

## 2022-09-20 ENCOUNTER — OFFICE VISIT (OUTPATIENT)
Dept: ENT CLINIC | Age: 52
End: 2022-09-20
Payer: OTHER GOVERNMENT

## 2022-09-20 VITALS
BODY MASS INDEX: 26.07 KG/M2 | HEART RATE: 72 BPM | WEIGHT: 172 LBS | TEMPERATURE: 98.1 F | SYSTOLIC BLOOD PRESSURE: 136 MMHG | DIASTOLIC BLOOD PRESSURE: 80 MMHG | HEIGHT: 68 IN

## 2022-09-20 DIAGNOSIS — J38.3 VOCAL CORD DYSPLASIA: ICD-10-CM

## 2022-09-20 DIAGNOSIS — K21.9 LARYNGOPHARYNGEAL REFLUX (LPR): Primary | ICD-10-CM

## 2022-09-20 DIAGNOSIS — R49.0 HOARSENESS: ICD-10-CM

## 2022-09-20 DIAGNOSIS — R09.82 POSTNASAL DRIP: ICD-10-CM

## 2022-09-20 PROCEDURE — 99214 OFFICE O/P EST MOD 30 MIN: CPT | Performed by: OTOLARYNGOLOGY

## 2022-09-20 PROCEDURE — 31575 DIAGNOSTIC LARYNGOSCOPY: CPT | Performed by: OTOLARYNGOLOGY

## 2022-09-20 RX ORDER — FAMOTIDINE 20 MG/1
20 TABLET, FILM COATED ORAL 2 TIMES DAILY
Qty: 180 TABLET | Refills: 1 | Status: SHIPPED | OUTPATIENT
Start: 2022-09-20

## 2022-09-20 RX ORDER — FAMOTIDINE 20 MG/1
20 TABLET, FILM COATED ORAL 2 TIMES DAILY
Qty: 180 TABLET | Refills: 1 | Status: SHIPPED | OUTPATIENT
Start: 2022-09-20 | End: 2022-09-20

## 2022-09-20 RX ORDER — FLUTICASONE PROPIONATE 50 MCG
1 SPRAY, SUSPENSION (ML) NASAL 2 TIMES DAILY
Qty: 16 G | Refills: 2 | Status: SHIPPED | OUTPATIENT
Start: 2022-09-20 | End: 2022-09-20

## 2022-09-20 RX ORDER — FLUTICASONE PROPIONATE 50 MCG
1 SPRAY, SUSPENSION (ML) NASAL 2 TIMES DAILY
Qty: 16 G | Refills: 2 | Status: SHIPPED | OUTPATIENT
Start: 2022-09-20

## 2022-09-20 ASSESSMENT — ENCOUNTER SYMPTOMS
VOICE CHANGE: 1
ABDOMINAL PAIN: 0
SORE THROAT: 1
SINUS PAIN: 0
RHINORRHEA: 0
SINUS PRESSURE: 0
WHEEZING: 0
SHORTNESS OF BREATH: 0

## 2022-09-20 NOTE — PROGRESS NOTES
Concern for post-nasal drip ongoing for 1.5 weeks, coughing up mucus that is clear colored. 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 Bilateral TVC polyps removed 6/10/2022 - moderate squamous dysplasia noted   Continuing smoking 10-11 cigarettes/day. Continuing omeprazole. Drinks decaffeinated tea.

## 2022-09-20 NOTE — PROGRESS NOTES
grocery store and speaks frequently. Smokes 1/2PPD, iced tea 1/2gal per day, states intermittent reflux symptoms. Interval History 9/20/2022: Concern for post-nasal drip ongoing for 1.5 weeks, coughing up mucus that is clear colored. 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 Bilateral TVC polyps removed 6/10/2022 - moderate squamous dysplasia noted   Continuing smoking 10-11 cigarettes/day. Continuing omeprazole. Drinks decaffeinated tea. Past Medical History     Past Medical History:   Diagnosis Date    Anxiety     COPD (chronic obstructive pulmonary disease) (HCC)     Hx of blood clots 1999    Bilateral DVTs years ago;  No anticoagulants currently    Hyperlipidemia     No medications currently       Past Surgical History     Past Surgical History:   Procedure Laterality Date    DILATION AND CURETTAGE OF UTERUS      LARYNGOSCOPY N/A 6/10/2022    DIRECT LARYNGOSCOPY MICROSCOPE WITH REMOVAL OF BILATERAL VOCAL CORD LESIONS performed by Jacki Maciel MD at [place] [address]         Family History     Family History   Problem Relation Age of Onset    Lung Cancer Mother     COPD Father     Diabetes type 2  Sister     Obesity Sister     Diabetes type 2  Brother     No Known Problems Brother        Social History     Social History     Tobacco Use    Smoking status: Every Day     Packs/day: 0.50     Types: Cigarettes    Smokeless tobacco: Never   Vaping Use    Vaping Use: Never used   Substance Use Topics    Alcohol use: Yes     Comment: rare    Drug use: Not Currently        Allergies     Allergies   Allergen Reactions    Ciprofloxacin     Levaquin [Levofloxacin]     Montelukast Itching    Oxycodone Swelling     Also rash    Pcn [Penicillins]     Prednisone Hives    Vicodin [Hydrocodone-Acetaminophen] Swelling     Also rash    Zyrtec [Cetirizine] Itching    Keflex [Cephalexin] Rash       Medications     Current Outpatient Medications   Medication Sig Dispense Refill famotidine (PEPCID) 20 MG tablet Take 1 tablet by mouth 2 times daily 180 tablet 1    fluticasone (FLONASE) 50 MCG/ACT nasal spray 1 spray by Each Nostril route 2 times daily 16 g 2    atorvastatin (LIPITOR) 20 MG tablet Take 1 tablet by mouth daily 90 tablet 0    nicotine (NICODERM CQ) 14 MG/24HR Place 1 patch onto the skin every 24 hours 90 patch 0    ondansetron (ZOFRAN-ODT) 4 MG disintegrating tablet Take 1 tablet by mouth 3 times daily as needed for Nausea or Vomiting 21 tablet 0    acetaminophen (TYLENOL) 500 MG tablet Take 1 tablet by mouth 4 times daily as needed for Pain 120 tablet 0    ibuprofen (ADVIL;MOTRIN) 600 MG tablet Take 1 tablet by mouth 4 times daily as needed for Pain 120 tablet 0    albuterol sulfate HFA (PROAIR HFA) 108 (90 Base) MCG/ACT inhaler Inhale 2 puffs into the lungs every 6 hours as needed for Wheezing 3 each 0    omeprazole (PRILOSEC) 20 MG delayed release capsule Take 1 capsule by mouth 2 times daily (before meals) 180 capsule 1    fluticasone (FLOVENT HFA) 44 MCG/ACT inhaler Inhale 2 puffs into the lungs 2 times daily 10.6 g 3    albuterol (PROVENTIL) (2.5 MG/3ML) 0.083% nebulizer solution Take 3 mLs by nebulization every 6 hours as needed for Wheezing 120 each 2    ipratropium-albuterol (DUONEB) 0.5-2.5 (3) MG/3ML SOLN nebulizer solution Inhale 3 mLs into the lungs every 4 hours as needed for Shortness of Breath 1620 mL 0     No current facility-administered medications for this visit. Review of Systems     Review of Systems   Constitutional:  Negative for activity change, chills, diaphoresis, fatigue and fever. HENT:  Positive for postnasal drip, sore throat and voice change. Negative for congestion, hearing loss, rhinorrhea, sinus pressure and sinus pain. Eyes:  Negative for visual disturbance. Respiratory:  Negative for shortness of breath and wheezing. Cardiovascular:  Negative for chest pain. Gastrointestinal:  Negative for abdominal pain. Musculoskeletal:  Negative for neck pain and neck stiffness. Skin:  Negative for rash. Neurological:  Negative for dizziness, light-headedness and headaches. Hematological:  Negative for adenopathy. PhysicalExam     Vitals:    09/20/22 1022   BP: 136/80   Pulse: 72   Temp: 98.1 °F (36.7 °C)   TempSrc: Infrared   Weight: 172 lb (78 kg)   Height: 5' 8\" (1.727 m)       Physical Exam  Vitals reviewed. Constitutional:       Appearance: Normal appearance. Comments: Significant hoarseness, asthenia -no shayy stridor    HENT:      Head: Normocephalic and atraumatic. Right Ear: Tympanic membrane, ear canal and external ear normal. There is no impacted cerumen. Left Ear: Tympanic membrane, ear canal and external ear normal. There is no impacted cerumen. Ears:      Hathaway exam findings: Does not lateralize. Right Rinne: AC > BC. Left Rinne: AC > BC. Nose: No congestion or rhinorrhea. Mouth/Throat:      Lips: Pink. No lesions. Mouth: Mucous membranes are moist. No oral lesions. Tongue: No lesions. Tongue does not deviate from midline. Palate: No mass and lesions. Pharynx: Oropharynx is clear. Uvula midline. No oropharyngeal exudate or posterior oropharyngeal erythema. Eyes:      Extraocular Movements: Extraocular movements intact. Pupils: Pupils are equal, round, and reactive to light. Musculoskeletal:      Cervical back: Neck supple. Lymphadenopathy:      Cervical: No cervical adenopathy. Neurological:      Mental Status: She is alert. Cranial Nerves: No cranial nerve deficit. Data Review        Procedure     Flexible Laryngoscopy    Pre op: Worsenings, breathiness,. Post op: Large vocal cord polyposis bilateral  Procedure : Flexible Nasopharyngolaryngoscopy  Surgeon: SELAM Do  Anesthesia: Afrin with 2% lidocaine  Estimated Blood Loss: None    Procedure:   Flexible Laryngoscopy     After obtaining consent, the patient was placed in the examination chair in the upright position. Decongestant and topical anesthetic was sprayed in the After allowing adequate time for hemostatic effect, the flexible 4 mm laryngoscope was passed via the bilateral nasal passages    Nasal Septum: No acute septal deviation, no septal hematoma or perforations noted   Nasal Findings: No active hemorrhage, no nasal masses appreciated   Nasopharynx: Clear, no masses or inflammation  Oropharynx: Bilateral tonsillar tissue absent, no exophytic masses  Base of Tongue: Lingual tonsils within normal limits, vallecula without effacement  Epiglottis: Upright, in normal anatomical position  True Vocal Cord: Bilateral vocal cord cords with white scarring on the medial edge of the true vocal cord, good lateral and medial movement, no glottic gap  False Vocal Cord: normal   Hypopharynx Mucosa: No masses or inflammation of the piriform sinuses or postcricoid area  Arytenoids: Normal mucosa, no dislocation appreciated     * Patient tolerated the procedure well with no complications   * Patient was instructed not to eat for 30 minutes following procedure. * Patient was instructed that they may notice minor bleeding. Attestation:   I was present for the entire viewing, including introduction and removal of the scope. Initial postoperative image:      Prior images (before surgery)        Clotilde Beltran MD  9/20/22    Portions of this note were dictated using Dragon.  There may be linguistic errors secondary to the use of this program.

## 2022-09-20 NOTE — Clinical Note
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 She had bilateral vocal cord lesions with moderate dysplasia, I removed these and her voice sounds a lot better. I started her on Flonase for postnasal drip, and switch her from omeprazole to Pepcid for reflux, but she is continue to smoke 10-11 cigarettes/day. I am worried that if she continues, this will eventually progress to malignancy-what would you recommend for smoking cessation? Best, [employer].

## 2022-10-28 ENCOUNTER — TELEPHONE (OUTPATIENT)
Dept: INTERNAL MEDICINE CLINIC | Age: 52
End: 2022-10-28

## 2022-10-28 DIAGNOSIS — J06.9 UPPER RESPIRATORY TRACT INFECTION, UNSPECIFIED TYPE: Primary | ICD-10-CM

## 2022-10-28 RX ORDER — AZITHROMYCIN 250 MG/1
250 TABLET, FILM COATED ORAL SEE ADMIN INSTRUCTIONS
Qty: 6 TABLET | Refills: 0 | Status: SHIPPED | OUTPATIENT
Start: 2022-10-28 | End: 2022-11-02

## 2022-10-28 NOTE — TELEPHONE ENCOUNTER
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 Patient tested negative for covid on 10/27/22. Patient is requesting medication.    [address]

## 2023-01-26 ENCOUNTER — TELEPHONE (OUTPATIENT)
Dept: INTERNAL MEDICINE CLINIC | Age: 53
End: 2023-01-26

## 2023-01-26 NOTE — TELEPHONE ENCOUNTER
----- Message from Sandy Camacho MD sent at 1/26/2023  1:01 PM EST -----  Paxlovid    Stop lipitor and advair for 5 days   ----- Message -----  From: Maulik Newsome  Sent: 1/26/2023  11:55 AM EST  To: MD Dr. Delores Romero pt- tested positive this morning.  Please advise.  ----- Message -----  From: Nidia Villalba MD  Sent: 1/26/2023  11:50 AM EST  To: Maulik Newsome    When did she test positive  ----- Message -----  From: Dae Dupont  Sent: 1/26/2023   9:32 AM EST  To: Nidia Villalba MD    Patient called she now has tested positive for covid fever,chills,achy,runny nose  [place] [employer] her company wants her to come back to work on Sunday and she doesn't think she should if you agree can you mail her a note stating when she can return  Her number is [phone] Thanks [address] [place] [address]

## 2023-01-26 NOTE — TELEPHONE ENCOUNTER
----- Message from Marilou Gee MD sent at 1/26/2023  4:02 PM EST -----  stop  ----- Message -----  From: Min He  Sent: 1/26/2023   3:41 PM EST  To: MD Facundo Estradanase is a D please advise  ----- Message -----  From: Marilou Gee MD  Sent: 1/26/2023   1:01 PM EST  To: [place] [employer]    Paxlovid    Stop lipitor and advair for 5 days   ----- Message -----  From: [place] Cassel  Sent: 1/26/2023  11:55 AM EST  To: MD Dr. Sunny Estrada pt- tested positive this morning.  Please advise.  ----- Message -----  From: Lenora Fountain MD  Sent: 1/26/2023  11:50 AM EST  To: [place] [employer]    When did she test positive  ----- Message -----  From: Derrek Miller  Sent: 1/26/2023   9:32 AM EST  To: Lenora Fountain MD    Patient called she now has tested positive for covid fever,chills,achy,runny nose  [place] [employer] her company wants her to come back to work on Sunday and she doesn't think she should if you agree can you mail her a note stating when she can return  Her number is [phone] Thanks [facility]

## 2023-01-26 NOTE — TELEPHONE ENCOUNTER
----- Message from Harsh Jackson MD sent at 1/26/2023  1:01 PM EST -----  Paxlovid    Stop lipitor and advair for 5 days   ----- Message -----  From: Roc Montgomery  Sent: 1/26/2023  11:55 AM EST  To: MD Dr. Jenaro Henley Sample pt- tested positive this morning.  Please advise.  ----- Message -----  From: Wilma Zabala MD  Sent: 1/26/2023  11:50 AM EST  To: Roc Montgomery    When did she test positive  ----- Message -----  From: Hayley Santiago  Sent: 1/26/2023   9:32 AM EST  To: Wilma Zabala MD    Patient called she now has tested positive for covid fever,chills,achy,runny nose  [place] [employer] her company wants her to come back to work on Sunday and she doesn't think she should if you agree can you mail her a note stating when she can return  Her number is [phone] Thanks [facility]

## 2023-01-26 NOTE — TELEPHONE ENCOUNTER
----- Message from Shaina Gautam sent at 1/26/2023  3:49 PM EST -----    ----- Message -----  From: Jim Green MD  Sent: 1/26/2023   1:01 PM EST  To: Tone Jacobs    Note ok for 5 days   ----- Message -----  From: Tone Jacobs  Sent: 1/26/2023  11:55 AM EST  To: MD Dr. Rebecca Vera pt- tested positive this morning.  Please advise.  ----- Message -----  From: Loki Murdock MD  Sent: 1/26/2023  11:50 AM EST  To: Tone Jacobs    When did she test positive  ----- Message -----  From: Guillermo Lawson  Sent: 1/26/2023   9:32 AM EST  To: Loki Murdock MD    Patient called she now has tested positive for covid fever,chills,achy,runny nose  [place] [employer] her company wants her to come back to work on Sunday and she doesn't think she should if you agree can you mail her a note stating when she can return  Her number is [phone] Thanks [facility]

## 2023-01-27 ENCOUNTER — TELEPHONE (OUTPATIENT)
Dept: INTERNAL MEDICINE CLINIC | Age: 53
End: 2023-01-27

## 2023-01-27 NOTE — TELEPHONE ENCOUNTER
----- Message from Konstantin Yu sent at 1/27/2023  9:28 AM EST -----  Contact: Patient [phone]  Patient is requesting a doctor's note and covid letter faxed to an employer.      [address] [place] [address]  Phone: [phone]  Fax: [phone]

## 2023-01-28 ENCOUNTER — HOSPITAL ENCOUNTER (EMERGENCY)
Age: 53
Discharge: HOME | End: 2023-01-28
Attending: EMERGENCY MEDICINE
Payer: OTHER GOVERNMENT

## 2023-01-28 ENCOUNTER — APPOINTMENT (OUTPATIENT)
Dept: GENERAL RADIOLOGY | Age: 53
End: 2023-01-28
Payer: OTHER GOVERNMENT

## 2023-01-28 ENCOUNTER — APPOINTMENT (OUTPATIENT)
Dept: CT IMAGING | Age: 53
End: 2023-01-28
Payer: OTHER GOVERNMENT

## 2023-01-28 VITALS
BODY MASS INDEX: 25.01 KG/M2 | DIASTOLIC BLOOD PRESSURE: 72 MMHG | RESPIRATION RATE: 19 BRPM | OXYGEN SATURATION: 96 % | HEART RATE: 63 BPM | WEIGHT: 165 LBS | TEMPERATURE: 98.2 F | SYSTOLIC BLOOD PRESSURE: 126 MMHG | HEIGHT: 68 IN

## 2023-01-28 DIAGNOSIS — U07.1 COVID-19 VIRUS INFECTION: Primary | ICD-10-CM

## 2023-01-28 DIAGNOSIS — R07.89 CHEST WALL PAIN: ICD-10-CM

## 2023-01-28 LAB
A/G RATIO: 1.2 (ref 1.1–2.2)
ALBUMIN SERPL-MCNC: 4.1 G/DL (ref 3.4–5)
ALP BLD-CCNC: 106 U/L (ref 40–129)
ALT SERPL-CCNC: 17 U/L (ref 10–40)
ANION GAP SERPL CALCULATED.3IONS-SCNC: 8 MMOL/L (ref 3–16)
APTT: 21.8 SEC (ref 23–34.3)
AST SERPL-CCNC: 25 U/L (ref 15–37)
BASE EXCESS VENOUS: 2.5 MMOL/L (ref -3–3)
BASOPHILS ABSOLUTE: 0.1 K/UL (ref 0–0.2)
BASOPHILS RELATIVE PERCENT: 1.1 %
BILIRUB SERPL-MCNC: <0.2 MG/DL (ref 0–1)
BUN BLDV-MCNC: 14 MG/DL (ref 7–20)
CALCIUM SERPL-MCNC: 8.6 MG/DL (ref 8.3–10.6)
CARBOXYHEMOGLOBIN: 5.4 % (ref 0–1.5)
CHLORIDE BLD-SCNC: 98 MMOL/L (ref 99–110)
CO2: 28 MMOL/L (ref 21–32)
CREAT SERPL-MCNC: 0.7 MG/DL (ref 0.6–1.1)
D DIMER: 2.59 UG/ML FEU (ref 0–0.6)
EOSINOPHILS ABSOLUTE: 0.1 K/UL (ref 0–0.6)
EOSINOPHILS RELATIVE PERCENT: 1.5 %
GFR SERPL CREATININE-BSD FRML MDRD: >60 ML/MIN/{1.73_M2}
GLUCOSE BLD-MCNC: 89 MG/DL (ref 70–99)
HCO3 VENOUS: 28.1 MMOL/L (ref 23–29)
HCT VFR BLD CALC: 47.2 % (ref 36–48)
HEMOGLOBIN: 16.3 G/DL (ref 12–16)
INR BLD: 0.86 (ref 0.87–1.14)
LYMPHOCYTES ABSOLUTE: 1.7 K/UL (ref 1–5.1)
LYMPHOCYTES RELATIVE PERCENT: 34.4 %
MCH RBC QN AUTO: 32.2 PG (ref 26–34)
MCHC RBC AUTO-ENTMCNC: 34.5 G/DL (ref 31–36)
MCV RBC AUTO: 93.3 FL (ref 80–100)
METHEMOGLOBIN VENOUS: 0.3 %
MONOCYTES ABSOLUTE: 0.8 K/UL (ref 0–1.3)
MONOCYTES RELATIVE PERCENT: 16 %
NEUTROPHILS ABSOLUTE: 2.4 K/UL (ref 1.7–7.7)
NEUTROPHILS RELATIVE PERCENT: 47 %
O2 CONTENT, VEN: 17 VOL %
O2 SAT, VEN: 71 %
O2 THERAPY: ABNORMAL
PCO2, VEN: 46.4 MMHG (ref 40–50)
PDW BLD-RTO: 13.4 % (ref 12.4–15.4)
PH VENOUS: 7.4 (ref 7.35–7.45)
PLATELET # BLD: 169 K/UL (ref 135–450)
PMV BLD AUTO: 9.5 FL (ref 5–10.5)
PO2, VEN: 37.5 MMHG (ref 25–40)
POTASSIUM REFLEX MAGNESIUM: 4 MMOL/L (ref 3.5–5.1)
PRO-BNP: 8 PG/ML (ref 0–124)
PROTHROMBIN TIME: 11.6 SEC (ref 11.7–14.5)
RBC # BLD: 5.06 M/UL (ref 4–5.2)
SODIUM BLD-SCNC: 134 MMOL/L (ref 136–145)
TCO2 CALC VENOUS: 30 MMOL/L
TOTAL PROTEIN: 7.5 G/DL (ref 6.4–8.2)
TROPONIN: <0.01 NG/ML
WBC # BLD: 5 K/UL (ref 4–11)

## 2023-01-28 PROCEDURE — 85025 COMPLETE CBC W/AUTO DIFF WBC: CPT

## 2023-01-28 PROCEDURE — 6360000002 HC RX W HCPCS: Performed by: EMERGENCY MEDICINE

## 2023-01-28 PROCEDURE — 96374 THER/PROPH/DIAG INJ IV PUSH: CPT

## 2023-01-28 PROCEDURE — 36415 COLL VENOUS BLD VENIPUNCTURE: CPT

## 2023-01-28 PROCEDURE — 85379 FIBRIN DEGRADATION QUANT: CPT

## 2023-01-28 PROCEDURE — 99285 EMERGENCY DEPT VISIT HI MDM: CPT

## 2023-01-28 PROCEDURE — 82803 BLOOD GASES ANY COMBINATION: CPT

## 2023-01-28 PROCEDURE — 71260 CT THORAX DX C+: CPT | Performed by: EMERGENCY MEDICINE

## 2023-01-28 PROCEDURE — 80053 COMPREHEN METABOLIC PANEL: CPT

## 2023-01-28 PROCEDURE — 84484 ASSAY OF TROPONIN QUANT: CPT

## 2023-01-28 PROCEDURE — 71045 X-RAY EXAM CHEST 1 VIEW: CPT

## 2023-01-28 PROCEDURE — 85610 PROTHROMBIN TIME: CPT

## 2023-01-28 PROCEDURE — 6360000004 HC RX CONTRAST MEDICATION: Performed by: EMERGENCY MEDICINE

## 2023-01-28 PROCEDURE — 93005 ELECTROCARDIOGRAM TRACING: CPT | Performed by: EMERGENCY MEDICINE

## 2023-01-28 PROCEDURE — 83880 ASSAY OF NATRIURETIC PEPTIDE: CPT

## 2023-01-28 PROCEDURE — 85730 THROMBOPLASTIN TIME PARTIAL: CPT

## 2023-01-28 RX ORDER — DIPHENHYDRAMINE HYDROCHLORIDE 50 MG/ML
25 INJECTION INTRAMUSCULAR; INTRAVENOUS ONCE
Status: COMPLETED | OUTPATIENT
Start: 2023-01-28 | End: 2023-01-28

## 2023-01-28 RX ADMIN — IOPAMIDOL 85 ML: 755 INJECTION, SOLUTION INTRAVENOUS at 20:52

## 2023-01-28 RX ADMIN — DIPHENHYDRAMINE HYDROCHLORIDE 25 MG: 50 INJECTION, SOLUTION INTRAMUSCULAR; INTRAVENOUS at 20:49

## 2023-01-28 ASSESSMENT — LIFESTYLE VARIABLES
HOW OFTEN DO YOU HAVE A DRINK CONTAINING ALCOHOL: NEVER
HOW MANY STANDARD DRINKS CONTAINING ALCOHOL DO YOU HAVE ON A TYPICAL DAY: PATIENT DOES NOT DRINK

## 2023-01-28 ASSESSMENT — PAIN - FUNCTIONAL ASSESSMENT: PAIN_FUNCTIONAL_ASSESSMENT: 0-10

## 2023-01-28 ASSESSMENT — PAIN DESCRIPTION - LOCATION: LOCATION: RIB CAGE

## 2023-01-28 ASSESSMENT — PAIN SCALES - GENERAL: PAINLEVEL_OUTOF10: 2

## 2023-01-28 ASSESSMENT — PAIN DESCRIPTION - DESCRIPTORS: DESCRIPTORS: ACHING;SHARP

## 2023-01-29 LAB
EKG ATRIAL RATE: 68 BPM
EKG DIAGNOSIS: NORMAL
EKG P AXIS: 80 DEGREES
EKG P-R INTERVAL: 178 MS
EKG Q-T INTERVAL: 406 MS
EKG QRS DURATION: 92 MS
EKG QTC CALCULATION (BAZETT): 431 MS
EKG R AXIS: 87 DEGREES
EKG T AXIS: 72 DEGREES
EKG VENTRICULAR RATE: 68 BPM

## 2023-01-29 PROCEDURE — 93010 ELECTROCARDIOGRAM REPORT: CPT | Performed by: INTERNAL MEDICINE

## 2023-01-29 NOTE — ED PROVIDER NOTES
[address] ED  EMERGENCY DEPARTMENT ENCOUNTER      Pt Name: Lisbet Enrique  MRN: [id number]  [place] 1970  Date of evaluation: 1/28/2023  Provider: Sandra Bernal MD    CHIEF COMPLAINT       Chief Complaint   Patient presents with    Positive For Covid-19     Tested positive on Thursday for covid, was given paxlovid to take. Rib Pain (injury)     Patient states has rib pain that goes into her kidney, she is concerned that she has a blood clot. Shortness of Breath     States can not take a deep breath without it hurting. HISTORY OF PRESENT ILLNESS   (Location/Symptom, Timing/Onset, Context/Setting, Quality, Duration, Modifying Factors, Severity)  Note limiting factors. Lisbet Enrique is a 47 y.o. female with past medical history of anxiety, hypertension, COPD and remote superficial venous thrombosis though no history of DVT here today with cough, shortness of breath, rib pain and recently diagnosed COVID-19. The patient presents emergency department today with a recent diagnosis of COVID-19 after testing +4 days ago. Multiple family members with the same. She states she has been coughing though that is gradually improved. No fevers in the last 24 to 48 hours. She now states she is having an aching pain in the posterior back worse with coughing or taking a deep breath. No fevers or chills. No hemoptysis. No abdominal pain, nausea, vomiting or diarrhea. No dysuria or hematuria. No lower extremity swelling, redness or pain. [facility]    Nursing Notes were reviewed. REVIEW OF SYSTEMS    (2-9 systems for level 4, 10 or more for level 5)     Review of Systems    Please see HPI for pertinent positive and negative review of system findings. A full 10 system ROS was performed and otherwise negative.         PAST MEDICAL HISTORY     Past Medical History:   Diagnosis Date    Anxiety     COPD (chronic obstructive pulmonary disease) ([facility] [address]     Hx of blood clots 1999    Bilateral DVTs years ago; No anticoagulants currently    Hyperlipidemia     No medications currently         SURGICAL HISTORY       Past Surgical History:   Procedure Laterality Date    DILATION AND CURETTAGE OF UTERUS      LARYNGOSCOPY N/A 6/10/2022    DIRECT LARYNGOSCOPY MICROSCOPE WITH REMOVAL OF BILATERAL VOCAL CORD LESIONS performed by Vernell Santo MD at [address] [place] [address]       Previous Medications    ACETAMINOPHEN (TYLENOL) 500 MG TABLET    Take 1 tablet by mouth 4 times daily as needed for Pain    ALBUTEROL (PROVENTIL) (2.5 MG/3ML) 0.083% NEBULIZER SOLUTION    Take 3 mLs by nebulization every 6 hours as needed for Wheezing    ALBUTEROL SULFATE HFA (PROAIR HFA) 108 (90 BASE) MCG/ACT INHALER    Inhale 2 puffs into the lungs every 6 hours as needed for Wheezing    ATORVASTATIN (LIPITOR) 20 MG TABLET    Take 1 tablet by mouth daily    FAMOTIDINE (PEPCID) 20 MG TABLET    Take 1 tablet by mouth 2 times daily    FLUTICASONE (FLONASE) 50 MCG/ACT NASAL SPRAY    1 spray by Each Nostril route 2 times daily    FLUTICASONE (FLOVENT HFA) 44 MCG/ACT INHALER    Inhale 2 puffs into the lungs 2 times daily    IBUPROFEN (ADVIL;MOTRIN) 600 MG TABLET    Take 1 tablet by mouth 4 times daily as needed for Pain    IPRATROPIUM-ALBUTEROL (DUONEB) 0.5-2.5 (3) MG/3ML SOLN NEBULIZER SOLUTION    Inhale 3 mLs into the lungs every 4 hours as needed for Shortness of Breath    NICOTINE (NICODERM CQ) 14 MG/24HR    Place 1 patch onto the skin every 24 hours    NIRMATRELVIR/RITONAVIR (PAXLOVID) 20 X 150 MG & 10 X 100MG TBPK    Take 3 tablets (two 150 mg nirmatrelvir and one 100 mg ritonavir tablets) by mouth every 12 hours for 5 days.     OMEPRAZOLE (PRILOSEC) 20 MG DELAYED RELEASE CAPSULE    Take 1 capsule by mouth 2 times daily (before meals)    ONDANSETRON (ZOFRAN-ODT) 4 MG DISINTEGRATING TABLET    Take 1 tablet by mouth 3 times daily as needed for Nausea or Vomiting ALLERGIES     Ciprofloxacin, Levaquin [levofloxacin], Montelukast, Oxycodone, Pcn [penicillins], Prednisone, Vicodin [hydrocodone-acetaminophen], Zyrtec [cetirizine], and Keflex [cephalexin]    FAMILY HISTORY       Family History   Problem Relation Age of Onset    Lung Cancer Mother     COPD Father     Diabetes type 2  Sister     Obesity Sister     Diabetes type 2  Brother     No Known Problems Brother           SOCIAL HISTORY       Social History     Socioeconomic History    Marital status:      Spouse name: None    Number of children: None    Years of education: None    Highest education level: None   Tobacco Use    Smoking status: Every Day     Packs/day: 0.50     Types: Cigarettes    Smokeless tobacco: Never   Vaping Use    Vaping Use: Never used   Substance and Sexual Activity    Alcohol use: Yes     Comment: rare    Drug use: Not Currently    Sexual activity: Yes     Partners: Male       SCREENINGS    Elvis Coma Scale  Eye Opening: Spontaneous  Best Verbal Response: Oriented  Best Motor Response: Obeys commands  Elvis Coma Scale Score: 15          PHYSICAL EXAM    (up to 7 for level 4, 8 or more for level 5)     ED Triage Vitals [01/28/23 1853]   BP Temp Temp Source Heart Rate Resp SpO2 Height Weight   118/64 98.2 °F (36.8 °C) Oral 82 10 100 % 5' 7.5\" (1.715 m) 165 lb (74.8 kg)       Physical Exam    General appearance:  Cooperative. No acute distress. Skin:  Warm. Dry. Eye:  Extraocular movements intact. Ears, nose, mouth and throat:  Oral mucosa moist,  Neck:  Trachea midline. Heart:  Regular rate and rhythm  Perfusion:  intact  Respiratory:  Respirations nonlabored. Lungs clear to auscultation bilaterally. Abdominal:   Non distended. Nontender  Neurological:  Alert and oriented x 3.   Moves all extremities spontaneously  Musculoskeletal:   Normal ROM, no deformities          Psychiatric:  Normal mood      DIAGNOSTIC RESULTS       Labs Reviewed   CBC WITH AUTO DIFFERENTIAL - Abnormal; Notable for the following components:       Result Value    Hemoglobin 16.3 (*)     All other components within normal limits   COMPREHENSIVE METABOLIC PANEL W/ REFLEX TO MG FOR LOW K - Abnormal; Notable for the following components:    Sodium 134 (*)     Chloride 98 (*)     All other components within normal limits   BLOOD GAS, VENOUS - Abnormal; Notable for the following components:    Carboxyhemoglobin 5.4 (*)     All other components within normal limits   D-DIMER, QUANTITATIVE - Abnormal; Notable for the following components:    D-Dimer, Quant 2.59 (*)     All other components within normal limits   PROTIME-INR - Abnormal; Notable for the following components:    Protime 11.6 (*)     INR 0.86 (*)     All other components within normal limits   APTT - Abnormal; Notable for the following components:    aPTT 21.8 (*)     All other components within normal limits   TROPONIN   BRAIN NATRIURETIC PEPTIDE       Interpretation per the Radiologist below, if obtained/available at the time of this note:    CT CHEST PULMONARY EMBOLISM W CONTRAST   Final Result   No pulmonary embolism or acute pulmonary abnormality. XR CHEST PORTABLE   Final Result   No acute abnormality. All other labs/imaging were within normal range or not returned as of this dictation. EMERGENCY DEPARTMENT COURSE and DIFFERENTIAL DIAGNOSIS/MDM:   Vitals:    Vitals:    01/28/23 1900 01/28/23 2000 01/28/23 2030 01/28/23 2100   BP: (!) 113/59 115/71 120/72 128/63   Pulse: 82 62 59 62   Resp: 17 17 14 16   Temp:       TempSrc:       SpO2: 99% 99% 97% 98%   Weight:       Height:           EKG *Interpreted by me*: Sinus rhythm rate of 60 bpm.  Left atrial enlargement. Anterior Q waves. No prior    Differential Diagnosis: Pneumonia, PE, muscle strain, pneumothorax    Patient presents emergency department today complaining of some pleuritic posterior back chest pain.   EMR notes that the patient has a history of \"blood clots\", but further history reveals these were likely superficial venous thromboses during pregnancy over 30 years ago. Not on anticoagulation. Vital signs stable. Resting comfortably. Breathing comfortably. D-dimer was sent which was markedly elevated prompting CT scan to rule out pulmonary embolism there is no evidence of this or pneumonia. Chest x-ray was clear. The remainder of her labs are unremarkable. She is resting comfortably. Suspect likely chest wall strain as result of recent coughing from COVID of which her symptoms seem to be improving. Do feel that she can be discharged home    Medications and Route:   Medications   iopamidol (ISOVUE-370) 76 % injection 85 mL (85 mLs IntraVENous Given 1/28/23 2052)   diphenhydrAMINE (BENADRYL) injection 25 mg (25 mg IntraVENous Given 1/28/23 2049)       History From: Patient         Chronic Conditions: Noted in HPI    CONSULTS: (Who and What was discussed)  None            Disposition Considerations (Tests not ordered but considered, Shared Decision Making, Pt Expectation of Test or Tx.):     Vin Schimtt M.D., am the primary clinician of record. MDM      CONSULTS     None    Critical Care:   None    REASSESSMENT          PROCEDURE     Unless otherwise noted below, none     Procedures      FINAL IMPRESSION      1. COVID-19 virus infection    2. Chest wall pain            DISPOSITION/PLAN   DISPOSITION Decision To Discharge 01/28/2023 09:41:50 PM        PATIENT REFERRED TO:  Harika Thurman MD  [facility] [place], [address] [facility]  [phone]    Schedule an appointment as soon as possible for a visit       DISCHARGE MEDICATIONS:  New Prescriptions    No medications on file     Controlled Substances Monitoring:     No flowsheet data found.     (Please note that portions of this note were completed with a voice recognition program.  Efforts were made to edit the dictations but occasionally words are mis-transcribed.)    Regis Kehr, MD (electronically signed)  Attending Emergency Physician            Nazia Aleman MD  01/28/23 4263

## 2023-01-30 ENCOUNTER — TELEPHONE (OUTPATIENT)
Dept: INTERNAL MEDICINE CLINIC | Age: 53
End: 2023-01-30

## 2023-01-30 NOTE — TELEPHONE ENCOUNTER
----- Message from Skyla Marcial MD sent at 1/30/2023 12:58 PM EST -----  Contact: Patient [phone]  I had talked her over the weekend. She can discontinue Paxlovid  There is no other medication.  ----- Message -----  From: Fifi Maria Victoria  Sent: 1/30/2023   9:27 AM EST  To: Skyla Marcial MD    Patient stated since taking nirmatrelvir/ritonavir (PAXLOVID) 20 x 150 MG & 10 x 100MG TBPK, has been extremely nauseated and more ill since taking it. Patient is requesting another medication or is stating wanting to discontinue it all together. Please advise.      [facility]/pharmacy #[id number] [place], [place]  - [address] [place] [address]

## 2023-03-01 ENCOUNTER — TELEPHONE (OUTPATIENT)
Dept: INTERNAL MEDICINE CLINIC | Age: 53
End: 2023-03-01

## 2023-03-01 RX ORDER — IPRATROPIUM BROMIDE AND ALBUTEROL SULFATE 2.5; .5 MG/3ML; MG/3ML
1 SOLUTION RESPIRATORY (INHALATION) EVERY 4 HOURS PRN
Qty: 1620 ML | Refills: 0 | Status: SHIPPED | OUTPATIENT
Start: 2023-03-01 | End: 2023-05-30

## 2023-03-01 NOTE — TELEPHONE ENCOUNTER
----- Message from Sandra Hurtado MD sent at 3/1/2023 11:09 AM EST -----  Contact: Mika Pfeiffer [phone]  ok  ----- Message -----  From: Ede Cobos MA  Sent: 2/28/2023  12:01 PM EST  To: Sandra Hurtado MD    Spouse called and stated that patient has chest and nasal congestion with minimal SOB X 4-5 days. Patient had covid 3-4 weeks ago. She declined appt due to work schedule. She would like refill of Duoneb.     ipratropium-albuterol (DUONEB) 0.5-2.5 (3) MG/3ML SOLN nebulizer solution    Sig - Route: Inhale 3 mLs into the lungs every 4 hours as needed for Shortness of Breath     Pharmacy [facility] [phone]

## 2023-03-21 ENCOUNTER — TELEPHONE (OUTPATIENT)
Dept: ENT CLINIC | Age: 53
End: 2023-03-21

## 2023-03-21 ENCOUNTER — OFFICE VISIT (OUTPATIENT)
Dept: ENT CLINIC | Age: 53
End: 2023-03-21
Payer: COMMERCIAL

## 2023-03-21 VITALS — TEMPERATURE: 97 F | BODY MASS INDEX: 25.01 KG/M2 | WEIGHT: 165 LBS | HEIGHT: 68 IN

## 2023-03-21 DIAGNOSIS — J38.3 VOCAL CORD DYSPLASIA: ICD-10-CM

## 2023-03-21 DIAGNOSIS — R09.82 POSTNASAL DRIP: Primary | ICD-10-CM

## 2023-03-21 PROCEDURE — 31575 DIAGNOSTIC LARYNGOSCOPY: CPT | Performed by: OTOLARYNGOLOGY

## 2023-03-21 PROCEDURE — 99213 OFFICE O/P EST LOW 20 MIN: CPT | Performed by: OTOLARYNGOLOGY

## 2023-03-21 RX ORDER — AZELASTINE 1 MG/ML
1 SPRAY, METERED NASAL 2 TIMES DAILY
Qty: 60 ML | Refills: 1 | Status: SHIPPED | OUTPATIENT
Start: 2023-03-21

## 2023-03-21 RX ORDER — AZELASTINE 1 MG/ML
1 SPRAY, METERED NASAL 2 TIMES DAILY
Qty: 60 ML | Refills: 1 | Status: SHIPPED | OUTPATIENT
Start: 2023-03-21 | End: 2023-03-21

## 2023-03-21 ASSESSMENT — ENCOUNTER SYMPTOMS
SORE THROAT: 1
WHEEZING: 0
VOICE CHANGE: 1
SINUS PRESSURE: 0
SHORTNESS OF BREATH: 0
ABDOMINAL PAIN: 0
SINUS PAIN: 0
RHINORRHEA: 0

## 2023-03-21 NOTE — PROGRESS NOTES
Refill    azelastine (ASTELIN) 0.1 % nasal spray 1 spray by Nasal route 2 times daily Use in each nostril as directed 60 mL 1    ipratropium-albuterol (DUONEB) 0.5-2.5 (3) MG/3ML SOLN nebulizer solution Inhale 3 mLs into the lungs every 4 hours as needed for Shortness of Breath 1620 mL 0    famotidine (PEPCID) 20 MG tablet Take 1 tablet by mouth 2 times daily 180 tablet 1    fluticasone (FLONASE) 50 MCG/ACT nasal spray 1 spray by Each Nostril route 2 times daily 16 g 2    atorvastatin (LIPITOR) 20 MG tablet Take 1 tablet by mouth daily 90 tablet 0    ondansetron (ZOFRAN-ODT) 4 MG disintegrating tablet Take 1 tablet by mouth 3 times daily as needed for Nausea or Vomiting 21 tablet 0    acetaminophen (TYLENOL) 500 MG tablet Take 1 tablet by mouth 4 times daily as needed for Pain 120 tablet 0    ibuprofen (ADVIL;MOTRIN) 600 MG tablet Take 1 tablet by mouth 4 times daily as needed for Pain 120 tablet 0    albuterol sulfate HFA (PROAIR HFA) 108 (90 Base) MCG/ACT inhaler Inhale 2 puffs into the lungs every 6 hours as needed for Wheezing 3 each 0    omeprazole (PRILOSEC) 20 MG delayed release capsule Take 1 capsule by mouth 2 times daily (before meals) 180 capsule 1    fluticasone (FLOVENT HFA) 44 MCG/ACT inhaler Inhale 2 puffs into the lungs 2 times daily 10.6 g 3    albuterol (PROVENTIL) (2.5 MG/3ML) 0.083% nebulizer solution Take 3 mLs by nebulization every 6 hours as needed for Wheezing 120 each 2    nicotine (NICODERM CQ) 14 MG/24HR Place 1 patch onto the skin every 24 hours 90 patch 0     No current facility-administered medications for this visit. Review of Systems     Review of Systems   Constitutional:  Negative for activity change, chills, diaphoresis, fatigue and fever. HENT:  Positive for postnasal drip, sore throat and voice change. Negative for congestion, hearing loss, rhinorrhea, sinus pressure and sinus pain. Eyes:  Negative for visual disturbance.    Respiratory:  Negative for shortness of

## 2023-06-19 ENCOUNTER — OFFICE VISIT (OUTPATIENT)
Dept: INTERNAL MEDICINE CLINIC | Age: 53
End: 2023-06-19

## 2023-06-19 VITALS
HEIGHT: 68 IN | DIASTOLIC BLOOD PRESSURE: 80 MMHG | HEART RATE: 70 BPM | BODY MASS INDEX: 25.76 KG/M2 | WEIGHT: 170 LBS | SYSTOLIC BLOOD PRESSURE: 120 MMHG | RESPIRATION RATE: 12 BRPM

## 2023-06-19 DIAGNOSIS — Z00.00 ENCOUNTER FOR WELL ADULT EXAM WITHOUT ABNORMAL FINDINGS: Primary | ICD-10-CM

## 2023-06-19 DIAGNOSIS — E78.5 HYPERLIPIDEMIA, UNSPECIFIED HYPERLIPIDEMIA TYPE: ICD-10-CM

## 2023-06-19 DIAGNOSIS — Z12.11 COLON CANCER SCREENING: ICD-10-CM

## 2023-06-19 DIAGNOSIS — J44.9 CHRONIC OBSTRUCTIVE PULMONARY DISEASE, UNSPECIFIED COPD TYPE (HCC): ICD-10-CM

## 2023-06-19 DIAGNOSIS — Z00.00 ROUTINE GENERAL MEDICAL EXAMINATION AT A HEALTH CARE FACILITY: ICD-10-CM

## 2023-06-19 LAB
ALBUMIN SERPL-MCNC: 4.4 G/DL (ref 3.4–5)
ALBUMIN/GLOB SERPL: 1.6 {RATIO} (ref 1.1–2.2)
ALP SERPL-CCNC: 107 U/L (ref 40–129)
ALT SERPL-CCNC: 12 U/L (ref 10–40)
ANION GAP SERPL CALCULATED.3IONS-SCNC: 12 MMOL/L (ref 3–16)
AST SERPL-CCNC: 15 U/L (ref 15–37)
BASOPHILS # BLD: 0.1 K/UL (ref 0–0.2)
BASOPHILS NFR BLD: 0.7 %
BILIRUB SERPL-MCNC: 0.4 MG/DL (ref 0–1)
BUN SERPL-MCNC: 13 MG/DL (ref 7–20)
CALCIUM SERPL-MCNC: 9.3 MG/DL (ref 8.3–10.6)
CHLORIDE SERPL-SCNC: 100 MMOL/L (ref 99–110)
CHOLEST SERPL-MCNC: 263 MG/DL (ref 0–199)
CO2 SERPL-SCNC: 24 MMOL/L (ref 21–32)
CREAT SERPL-MCNC: 0.5 MG/DL (ref 0.6–1.1)
DEPRECATED RDW RBC AUTO: 13.1 % (ref 12.4–15.4)
EOSINOPHIL # BLD: 0.2 K/UL (ref 0–0.6)
EOSINOPHIL NFR BLD: 3 %
GFR SERPLBLD CREATININE-BSD FMLA CKD-EPI: >60 ML/MIN/{1.73_M2}
GLUCOSE SERPL-MCNC: 79 MG/DL (ref 70–99)
HCT VFR BLD AUTO: 44.3 % (ref 36–48)
HDLC SERPL-MCNC: 56 MG/DL (ref 40–60)
HGB BLD-MCNC: 14.7 G/DL (ref 12–16)
LDLC SERPL CALC-MCNC: 190 MG/DL
LYMPHOCYTES # BLD: 1.9 K/UL (ref 1–5.1)
LYMPHOCYTES NFR BLD: 26.5 %
MCH RBC QN AUTO: 32 PG (ref 26–34)
MCHC RBC AUTO-ENTMCNC: 33.1 G/DL (ref 31–36)
MCV RBC AUTO: 96.6 FL (ref 80–100)
MONOCYTES # BLD: 0.6 K/UL (ref 0–1.3)
MONOCYTES NFR BLD: 8.4 %
NEUTROPHILS # BLD: 4.5 K/UL (ref 1.7–7.7)
NEUTROPHILS NFR BLD: 61.4 %
PLATELET # BLD AUTO: 174 K/UL (ref 135–450)
PMV BLD AUTO: 10.1 FL (ref 5–10.5)
POTASSIUM SERPL-SCNC: 4 MMOL/L (ref 3.5–5.1)
PROT SERPL-MCNC: 7.2 G/DL (ref 6.4–8.2)
RBC # BLD AUTO: 4.58 M/UL (ref 4–5.2)
SODIUM SERPL-SCNC: 136 MMOL/L (ref 136–145)
TRIGL SERPL-MCNC: 83 MG/DL (ref 0–150)
TSH SERPL DL<=0.005 MIU/L-ACNC: 2.91 UIU/ML (ref 0.27–4.2)
URATE SERPL-MCNC: 3.2 MG/DL (ref 2.6–6)
VLDLC SERPL CALC-MCNC: 17 MG/DL
WBC # BLD AUTO: 7.3 K/UL (ref 4–11)

## 2023-06-19 PROCEDURE — 99396 PREV VISIT EST AGE 40-64: CPT | Performed by: INTERNAL MEDICINE

## 2023-06-19 PROCEDURE — 82274 ASSAY TEST FOR BLOOD FECAL: CPT | Performed by: INTERNAL MEDICINE

## 2023-06-19 RX ORDER — IPRATROPIUM BROMIDE AND ALBUTEROL SULFATE 2.5; .5 MG/3ML; MG/3ML
1 SOLUTION RESPIRATORY (INHALATION) EVERY 4 HOURS PRN
Qty: 1620 ML | Refills: 0 | Status: SHIPPED | OUTPATIENT
Start: 2023-06-19 | End: 2023-09-17

## 2023-06-19 RX ORDER — ASPIRIN 81 MG/1
81 TABLET ORAL DAILY
COMMUNITY

## 2023-06-19 RX ORDER — ALBUTEROL SULFATE 90 UG/1
2 AEROSOL, METERED RESPIRATORY (INHALATION) EVERY 6 HOURS PRN
Qty: 3 EACH | Refills: 0 | Status: SHIPPED | OUTPATIENT
Start: 2023-06-19

## 2023-06-19 ASSESSMENT — ENCOUNTER SYMPTOMS
WHEEZING: 0
RHINORRHEA: 0
VOMITING: 0
SHORTNESS OF BREATH: 0
ABDOMINAL PAIN: 0
NAUSEA: 0

## 2023-06-19 ASSESSMENT — PATIENT HEALTH QUESTIONNAIRE - PHQ9
1. LITTLE INTEREST OR PLEASURE IN DOING THINGS: 0
SUM OF ALL RESPONSES TO PHQ QUESTIONS 1-9: 0
SUM OF ALL RESPONSES TO PHQ9 QUESTIONS 1 & 2: 0
2. FEELING DOWN, DEPRESSED OR HOPELESS: 0
SUM OF ALL RESPONSES TO PHQ QUESTIONS 1-9: 0

## 2023-06-19 NOTE — PROGRESS NOTES
2023    Stanford Brink (:  1970) is a 47 y.o. female, here for a preventive medicine evaluation. She has COPD. It is under fair control. No ER visits. She has not been vaccinated for COVID. She has not had her inhalers. She has heartburn. No dysphagia. On Pepcid prn. Patient Active Problem List   Diagnosis    Chronic obstructive pulmonary disease (HCC)    Hyperlipidemia    Hoarseness of voice    Luisana's edema of vocal folds       Review of Systems   Constitutional:  Negative for appetite change and fatigue. HENT:  Negative for postnasal drip and rhinorrhea. Respiratory:  Negative for shortness of breath and wheezing. Cardiovascular:  Negative for chest pain and palpitations. Gastrointestinal:  Negative for abdominal pain, nausea and vomiting. Musculoskeletal:  Negative for joint swelling. Skin:  Negative for rash. Neurological:  Negative for light-headedness. Psychiatric/Behavioral:  Negative for sleep disturbance. Prior to Visit Medications    Medication Sig Taking?  Authorizing Provider   albuterol sulfate HFA (PROAIR HFA) 108 (90 Base) MCG/ACT inhaler Inhale 2 puffs into the lungs every 6 hours as needed for Wheezing Yes Mortimer Congress, MD   ipratropium 0.5 mg-albuterol 2.5 mg (DUONEB) 0.5-2.5 (3) MG/3ML SOLN nebulizer solution Inhale 3 mLs into the lungs every 4 hours as needed for Shortness of Breath Yes Mortimer Congress, MD   aspirin 81 MG EC tablet Take 1 tablet by mouth daily Yes Historical Provider, MD   famotidine (PEPCID) 20 MG tablet Take 1 tablet by mouth 2 times daily Yes Jonathan Romero MD   fluticasone (FLONASE) 50 MCG/ACT nasal spray 1 spray by Each Nostril route 2 times daily Yes Jonathan Romero MD   omeprazole (PRILOSEC) 20 MG delayed release capsule Take 1 capsule by mouth 2 times daily (before meals)  Jonathan Romero MD        Allergies   Allergen Reactions    Ciprofloxacin     Levaquin [Levofloxacin]     Montelukast

## 2023-06-21 ENCOUNTER — TELEPHONE (OUTPATIENT)
Dept: INTERNAL MEDICINE CLINIC | Age: 53
End: 2023-06-21

## 2023-06-21 DIAGNOSIS — E78.5 HYPERLIPIDEMIA, UNSPECIFIED HYPERLIPIDEMIA TYPE: Primary | ICD-10-CM

## 2023-06-21 RX ORDER — ATORVASTATIN CALCIUM 20 MG/1
20 TABLET, FILM COATED ORAL DAILY
Qty: 90 TABLET | Refills: 0 | Status: SHIPPED | OUTPATIENT
Start: 2023-06-21

## 2023-06-21 NOTE — TELEPHONE ENCOUNTER
----- Message from Amauri Zaidi MD sent at 6/20/2023  2:50 PM EDT -----  Cholesterol very high. . Indication to treat. Start Lipitor 20 mg a day. Check liver and lipids in 6 weeks.

## 2023-06-27 ENCOUNTER — PROCEDURE VISIT (OUTPATIENT)
Dept: SPEECH THERAPY | Age: 53
End: 2023-06-27
Payer: OTHER GOVERNMENT

## 2023-06-27 DIAGNOSIS — J38.1 REINKE'S EDEMA OF VOCAL FOLDS: ICD-10-CM

## 2023-06-27 DIAGNOSIS — J38.4 VOCAL CORD EDEMA: ICD-10-CM

## 2023-06-27 DIAGNOSIS — R49.0 DYSPHONIA: Primary | ICD-10-CM

## 2023-06-27 PROCEDURE — 31579 LARYNGOSCOPY TELESCOPIC: CPT | Performed by: SPEECH-LANGUAGE PATHOLOGIST

## 2023-06-27 PROCEDURE — 92524 BEHAVRAL QUALIT ANALYS VOICE: CPT | Performed by: SPEECH-LANGUAGE PATHOLOGIST

## 2023-06-27 PROCEDURE — 92507 TX SP LANG VOICE COMM INDIV: CPT | Performed by: SPEECH-LANGUAGE PATHOLOGIST

## 2023-06-30 ENCOUNTER — NURSE ONLY (OUTPATIENT)
Dept: INTERNAL MEDICINE CLINIC | Age: 53
End: 2023-06-30

## 2023-06-30 DIAGNOSIS — Z12.11 COLON CANCER SCREENING: Primary | ICD-10-CM

## 2023-06-30 LAB
CONTROL: NORMAL
HEMOCCULT STL QL: NORMAL

## 2023-06-30 PROCEDURE — 82274 ASSAY TEST FOR BLOOD FECAL: CPT | Performed by: INTERNAL MEDICINE

## 2023-09-06 ENCOUNTER — HOSPITAL ENCOUNTER (EMERGENCY)
Age: 53
Discharge: HOME | End: 2023-09-06

## 2023-09-06 ENCOUNTER — APPOINTMENT (OUTPATIENT)
Dept: GENERAL RADIOLOGY | Age: 53
End: 2023-09-06

## 2023-09-06 VITALS
TEMPERATURE: 97.9 F | WEIGHT: 175 LBS | BODY MASS INDEX: 26.52 KG/M2 | RESPIRATION RATE: 16 BRPM | SYSTOLIC BLOOD PRESSURE: 137 MMHG | HEART RATE: 65 BPM | DIASTOLIC BLOOD PRESSURE: 78 MMHG | OXYGEN SATURATION: 97 % | HEIGHT: 68 IN

## 2023-09-06 DIAGNOSIS — S63.501A SPRAIN OF RIGHT WRIST, INITIAL ENCOUNTER: Primary | ICD-10-CM

## 2023-09-06 PROCEDURE — 73130 X-RAY EXAM OF HAND: CPT

## 2023-09-06 PROCEDURE — 73110 X-RAY EXAM OF WRIST: CPT

## 2023-09-06 PROCEDURE — 99283 EMERGENCY DEPT VISIT LOW MDM: CPT

## 2023-09-06 ASSESSMENT — PAIN - FUNCTIONAL ASSESSMENT: PAIN_FUNCTIONAL_ASSESSMENT: 0-10

## 2023-09-06 ASSESSMENT — PAIN DESCRIPTION - LOCATION: LOCATION: HAND;WRIST

## 2023-09-06 ASSESSMENT — PAIN DESCRIPTION - ORIENTATION: ORIENTATION: RIGHT

## 2023-09-06 ASSESSMENT — PAIN SCALES - GENERAL: PAINLEVEL_OUTOF10: 6

## 2023-09-07 ENCOUNTER — TELEPHONE (OUTPATIENT)
Dept: ORTHOPEDIC SURGERY | Age: 53
End: 2023-09-07

## 2023-09-07 NOTE — DISCHARGE INSTRUCTIONS
Exam reveals fullness of the central aspect of the dorsum right wrist.  X-ray negative on injury. Splint applied. Consider ligamentous injury or ganglion cyst early formation. I do recommend ibuprofen 6 and milligram 3 times a day along with Tylenol 1000 g 3 times a day. Contact orthopedic Dr. Todd Mckeon office tomorrow for an appointment with Dr. Lauren Naranjo or associate.

## 2023-09-07 NOTE — TELEPHONE ENCOUNTER
Did leave message regarding ED ref for a f/u appointment. Upon return call please schedule with Dr. Carol Otto.

## 2023-09-08 ENCOUNTER — TELEPHONE (OUTPATIENT)
Dept: ORTHOPEDIC SURGERY | Age: 53
End: 2023-09-08

## 2023-09-08 NOTE — TELEPHONE ENCOUNTER
Appointment Request     Patient requesting earlier appointment: Yes  Appointment offered to patient: YES    Patient Contact Number: [phone]      PATIENT IS REQ SOONER APPT AT THE [place] LOCATION ONLY, [address] [facility] ED REF 9/6 TO DR BARRETT  RT [facility] [place]. PLEASE RETURN CALL TO THE ABOVE NUMBER.

## 2023-09-21 ENCOUNTER — OFFICE VISIT (OUTPATIENT)
Dept: ENT CLINIC | Age: 53
End: 2023-09-21
Payer: OTHER GOVERNMENT

## 2023-09-21 VITALS
TEMPERATURE: 97.2 F | BODY MASS INDEX: 26.52 KG/M2 | WEIGHT: 175 LBS | HEIGHT: 68 IN | DIASTOLIC BLOOD PRESSURE: 78 MMHG | HEART RATE: 66 BPM | SYSTOLIC BLOOD PRESSURE: 142 MMHG | RESPIRATION RATE: 16 BRPM

## 2023-09-21 DIAGNOSIS — R09.82 POSTNASAL DRIP: ICD-10-CM

## 2023-09-21 DIAGNOSIS — J38.3 VOCAL CORD DYSPLASIA: Primary | ICD-10-CM

## 2023-09-21 PROCEDURE — 31575 DIAGNOSTIC LARYNGOSCOPY: CPT | Performed by: OTOLARYNGOLOGY

## 2023-09-21 PROCEDURE — 99203 OFFICE O/P NEW LOW 30 MIN: CPT | Performed by: OTOLARYNGOLOGY

## 2023-09-21 ASSESSMENT — ENCOUNTER SYMPTOMS
VOICE CHANGE: 1
TROUBLE SWALLOWING: 0
COUGH: 0
FACIAL SWELLING: 0
SINUS PRESSURE: 0
SHORTNESS OF BREATH: 0
EYE ITCHING: 0
APNEA: 0
SORE THROAT: 0

## 2023-09-21 NOTE — PROGRESS NOTES
normal.   Psychiatric:         Thought Content: Thought content normal.           Procedure     Flexible Laryngoscopy    Pre op: vocal cord dysplasia. 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 The base of tongue, vallecula, epiglottis, aryepiglottic folds, arytenoids, false vocal folds, true vocal folds, or pyriform sinuses were all evaluated. True vocal folds exhibited symmetric motion bilaterally without evidence of paralysis or paresis. The scope was removed. The patient tolerated the procedure without difficulty. There were no complications. Pertinent findings: no evidence of recurrent dysplasia        Assessment and Plan     1. Vocal cord dysplasia  -stables examination  -no evidence of recurrence    2. Postnasal drip  -cannot tolerate oral antihistamines, nasal sprays  -plans to trial Singulair again- not sure she had a true reaction  -not interested in allergy testing/shots    Follow up in 6 months        Lou Osorio DO  9/21/23      Portions of this note were dictated using Dragon.  There may be linguistic errors secondary to the use of this program.

## 2023-10-24 ENCOUNTER — OFFICE VISIT (OUTPATIENT)
Dept: INTERNAL MEDICINE CLINIC | Age: 53
End: 2023-10-24

## 2023-10-24 VITALS
BODY MASS INDEX: 26.52 KG/M2 | WEIGHT: 175 LBS | HEIGHT: 68 IN | RESPIRATION RATE: 12 BRPM | DIASTOLIC BLOOD PRESSURE: 80 MMHG | SYSTOLIC BLOOD PRESSURE: 130 MMHG | HEART RATE: 70 BPM

## 2023-10-24 DIAGNOSIS — E78.5 HYPERLIPIDEMIA, UNSPECIFIED HYPERLIPIDEMIA TYPE: ICD-10-CM

## 2023-10-24 DIAGNOSIS — J44.9 CHRONIC OBSTRUCTIVE PULMONARY DISEASE, UNSPECIFIED COPD TYPE (HCC): Primary | ICD-10-CM

## 2023-10-24 DIAGNOSIS — Z12.31 ENCOUNTER FOR SCREENING MAMMOGRAM FOR MALIGNANT NEOPLASM OF BREAST: ICD-10-CM

## 2023-10-24 DIAGNOSIS — J38.1 VOCAL CORD POLYPS: ICD-10-CM

## 2023-10-24 PROCEDURE — 99213 OFFICE O/P EST LOW 20 MIN: CPT | Performed by: INTERNAL MEDICINE

## 2023-10-24 ASSESSMENT — ENCOUNTER SYMPTOMS
WHEEZING: 0
VOMITING: 0
ABDOMINAL PAIN: 0
RHINORRHEA: 0
SHORTNESS OF BREATH: 0
NAUSEA: 0

## 2023-10-24 NOTE — PROGRESS NOTES
dysplasia/squamous intraepithelial neoplasia 2 (SIN   2). - PAS stain is negative for fungal elements. B.  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 - PAS stain is negative for fungal elements. COMMENT:    Both specimens show inflamed squamous mucosa with epithelial   atypia and ulceration. Multiple foci show features consistent with   moderate squamous dysplasia. No evidence of malignancy is present. Clinical correlation is recommended. BUCCA/BUCCA     Assessment:       Diagnosis Orders   1. Chronic obstructive pulmonary disease, unspecified COPD type ([address])        2. Hyperlipidemia, unspecified hyperlipidemia type        3. Vocal cord polyps        4. Encounter for screening mammogram for malignant neoplasm of breast  Mammography screening bilateral             Plan:      #  COPD. She has not done her PFT. We have discussed this many times. Doing ok. #  She refused colonoscopy. FIT negative. #  HLD. Elevated. On diet and herbal stuff. Check labs. # Has some hoarseness. Vocal cord polyps removed. They are pre cancerous. She saw ENT recently. #  Counselled to stop smoking. Nicotine patch ordered.                 Jhoana Soto MD

## 2024-01-03 ENCOUNTER — APPOINTMENT (OUTPATIENT)
Dept: GENERAL RADIOLOGY | Age: 54
End: 2024-01-03
Payer: OTHER GOVERNMENT

## 2024-01-03 ENCOUNTER — HOSPITAL ENCOUNTER (EMERGENCY)
Age: 54
Discharge: HOME | End: 2024-01-03
Payer: OTHER GOVERNMENT

## 2024-01-03 VITALS
RESPIRATION RATE: 18 BRPM | BODY MASS INDEX: 26.61 KG/M2 | WEIGHT: 175 LBS | SYSTOLIC BLOOD PRESSURE: 124 MMHG | OXYGEN SATURATION: 96 % | HEART RATE: 79 BPM | DIASTOLIC BLOOD PRESSURE: 63 MMHG | TEMPERATURE: 96.4 F

## 2024-01-03 DIAGNOSIS — M25.572 LEFT ANKLE PAIN, UNSPECIFIED CHRONICITY: Primary | ICD-10-CM

## 2024-01-03 PROCEDURE — 6370000000 HC RX 637 (ALT 250 FOR IP): Performed by: NURSE PRACTITIONER

## 2024-01-03 PROCEDURE — 99283 EMERGENCY DEPT VISIT LOW MDM: CPT

## 2024-01-03 PROCEDURE — 73610 X-RAY EXAM OF ANKLE: CPT

## 2024-01-03 RX ORDER — NAPROXEN 250 MG/1
250 TABLET ORAL ONCE
Status: COMPLETED | OUTPATIENT
Start: 2024-01-03 | End: 2024-01-03

## 2024-01-03 RX ORDER — NAPROXEN 250 MG/1
250 TABLET ORAL 2 TIMES DAILY WITH MEALS
Qty: 20 TABLET | Refills: 1 | Status: SHIPPED | OUTPATIENT
Start: 2024-01-03

## 2024-01-03 RX ADMIN — NAPROXEN 250 MG: 250 TABLET ORAL at 10:18

## 2024-01-03 ASSESSMENT — ENCOUNTER SYMPTOMS
FACIAL SWELLING: 0
RHINORRHEA: 0
COLOR CHANGE: 0
SORE THROAT: 0
SHORTNESS OF BREATH: 0
ABDOMINAL PAIN: 0

## 2024-01-03 NOTE — ED PROVIDER NOTES
[facility] ED  EMERGENCY DEPARTMENT ENCOUNTER      I am the Primary Clinician of Record    Note started: 11:04 AM EST 1/3/24    SARAH. I have evaluated this patient.          Pt Name: Yadira Olivsa  MRN: [id number]  Birthdate 1970  Dateof evaluation: 1/3/2024  Provider: Ebony Wade, APRN - CNP  PCP: Ovi Disla MD  ED Attending: No att. providers found      CHIEF COMPLAINT       Chief Complaint   Patient presents with    Ankle Pain     Left ankle pain.  Unsure of an injury.       HISTORY OF PRESENTILLNESS   (Location/Symptom, Timing/Onset, Context/Setting, Quality, Duration, Modifying Factors, Severity)  Note limiting factors.     Yadira Olivas is a 53 y.o. female for left ankle pain. Onset was for awhile.  Context includes patient reports that she has had left-sided pain and swelling for an undetermined amount of time.  Patient denies any injury.  She is complaining of swelling to the medial aspect of the left ankle.  She is neurovascular intact.. Alleviating factors include nothing.  Aggravating factors include nothing. Pain is 4/10.  Nothing has been used for pain today.     Nursing Notes were all reviewed and agreed with or any disagreements were addressed  in the HPI.      REVIEW OF SYSTEMS       Review of Systems   Constitutional:  Negative for activity change, appetite change and fever.   HENT:  Negative for congestion, facial swelling, rhinorrhea and sore throat.    Eyes:  Negative for visual disturbance.   Respiratory:  Negative for shortness of breath.    Cardiovascular:  Negative for chest pain.   Gastrointestinal:  Negative for abdominal pain.   Genitourinary:  Negative for difficulty urinating.   Musculoskeletal:  Negative for arthralgias and myalgias.        Left ankle pain and swelling   Skin:  Negative for color change and rash.   Neurological:  Negative for dizziness and light-headedness.   Psychiatric/Behavioral:  Negative for agitation.    All

## 2024-01-16 ENCOUNTER — TELEPHONE (OUTPATIENT)
Dept: INTERNAL MEDICINE CLINIC | Age: 54
End: 2024-01-16

## 2024-01-16 NOTE — TELEPHONE ENCOUNTER
----- Message from Ovi Disla MD sent at 1/16/2024  1:52 PM EST -----  Contact: [phone]  Abdomen test for COVID.  Have him come and see Mary.  ----- Message -----  From: Ayesha Hewitt MA  Sent: 1/16/2024   9:22 AM EST  To: Ovi Disla MD    Patient called and is stating that she and her  ( Hanna Olivas 07/28/1953 ) have been dealing with congestion, sinus drainage, coughs that are sometimes dry and sometimes productive, runny noses and now are starting to wheeze with breathing. This has been ongoing for both since 1/12. They have not tested for Covid. They are hoping you can call them something in to help kick this out.       Pharmacy:     [facility] [place]

## 2024-01-16 NOTE — TELEPHONE ENCOUNTER
----- Message from Ovi Disla MD sent at 1/16/2024  1:52 PM EST -----  Contact: [phone]  Abdomen test for COVID.  Have him come and see Mary.  ----- Message -----  From: Ayesha Hewitt MA  Sent: 1/16/2024   9:22 AM EST  To: Ovi Disal MD    Patient called and is stating that she and her  ( Hanna Olivas 07/28/1953 ) have been dealing with congestion, sinus drainage, coughs that are sometimes dry and sometimes productive, runny noses and now are starting to wheeze with breathing. This has been ongoing for both since 1/12. They have not tested for Covid. They are hoping you can call them something in to help kick this out.       Pharmacy:     [facility] [place]

## 2024-03-20 ENCOUNTER — TELEPHONE (OUTPATIENT)
Dept: INTERNAL MEDICINE CLINIC | Age: 54
End: 2024-03-20

## 2024-03-20 RX ORDER — AZITHROMYCIN 250 MG/1
TABLET, FILM COATED ORAL
Qty: 1 PACKET | Refills: 0 | Status: SHIPPED | OUTPATIENT
Start: 2024-03-20

## 2024-03-20 NOTE — TELEPHONE ENCOUNTER
----- Message from Ovi Disla MD sent at 3/20/2024  2:17 PM EDT -----  Contact: [phone]  [facility]  ----- Message -----  From: Nisha Valencia  Sent: 3/20/2024  12:04 PM EDT  To: Ovi Disla MD    Pt states for over a week she has been experiencing a cough with green sputum, strained breathing and congestion. Pt states this is what the start of her getting bronchitis is like and concerned it will get worse. She has been taking benadryl and albuterol. No fever and negative for Covid. Pt requesting a zpak called into pharmacy. Please advise.         [facility]/pharmacy #[id number] - [place], [place] - [address] [facility] [place] - P [phone] - F [phone] (Ph: [phone])

## 2024-04-17 ENCOUNTER — OFFICE VISIT (OUTPATIENT)
Dept: ENT CLINIC | Age: 54
End: 2024-04-17
Payer: OTHER GOVERNMENT

## 2024-04-17 VITALS
SYSTOLIC BLOOD PRESSURE: 154 MMHG | HEART RATE: 59 BPM | RESPIRATION RATE: 16 BRPM | BODY MASS INDEX: 26.52 KG/M2 | HEIGHT: 68 IN | DIASTOLIC BLOOD PRESSURE: 77 MMHG | WEIGHT: 175 LBS

## 2024-04-17 DIAGNOSIS — J38.3 VOCAL CORD DYSPLASIA: Primary | ICD-10-CM

## 2024-04-17 DIAGNOSIS — E78.5 HYPERLIPIDEMIA, UNSPECIFIED HYPERLIPIDEMIA TYPE: ICD-10-CM

## 2024-04-17 DIAGNOSIS — R09.81 NASAL CONGESTION: ICD-10-CM

## 2024-04-17 LAB
ALBUMIN SERPL-MCNC: 4.5 G/DL (ref 3.4–5)
ALP SERPL-CCNC: 134 U/L (ref 40–129)
ALT SERPL-CCNC: 11 U/L (ref 10–40)
AST SERPL-CCNC: 16 U/L (ref 15–37)
BILIRUB DIRECT SERPL-MCNC: <0.2 MG/DL (ref 0–0.3)
BILIRUB INDIRECT SERPL-MCNC: ABNORMAL MG/DL (ref 0–1)
BILIRUB SERPL-MCNC: 0.4 MG/DL (ref 0–1)
CHOLEST SERPL-MCNC: 255 MG/DL (ref 0–199)
HDLC SERPL-MCNC: 54 MG/DL (ref 40–60)
LDLC SERPL CALC-MCNC: 187 MG/DL
PROT SERPL-MCNC: 7.3 G/DL (ref 6.4–8.2)
TRIGL SERPL-MCNC: 72 MG/DL (ref 0–150)
VLDLC SERPL CALC-MCNC: 14 MG/DL

## 2024-04-17 PROCEDURE — 99213 OFFICE O/P EST LOW 20 MIN: CPT | Performed by: OTOLARYNGOLOGY

## 2024-04-17 PROCEDURE — 31575 DIAGNOSTIC LARYNGOSCOPY: CPT | Performed by: OTOLARYNGOLOGY

## 2024-04-17 ASSESSMENT — ENCOUNTER SYMPTOMS
TROUBLE SWALLOWING: 0
EYE ITCHING: 0
APNEA: 0
FACIAL SWELLING: 0
SORE THROAT: 0
SINUS PRESSURE: 0
COUGH: 0
SHORTNESS OF BREATH: 0
VOICE CHANGE: 0

## 2024-04-17 NOTE — PROGRESS NOTES
nerve deficit.      Coordination: Coordination normal.      Gait: Gait normal.   Psychiatric:         Thought Content: Thought content normal.           Procedure     Flexible Laryngoscopy    Pre op: Vocal cord dysplasia.   Post op: Same  Procedure : Flexible Laryngoscopy  Surgeon: Pat Nunez DO  Anesthesia: Afrin with 4% lidocaine  Indication: 53-year-old female with history of dysplasia true vocal cords laryngeal mirror examination was not tolerated due to gag reflex  Description:  The scope was passed along the floor of the right naris to the level of the larynx. The base of tongue, vallecula, epiglottis, aryepiglottic folds, arytenoids, false vocal folds, true vocal folds, or pyriform sinuses were all evaluated. True vocal folds exhibited symmetric motion bilaterally without evidence of paralysis or paresis.The scope was removed. The patient tolerated the procedure without difficulty. There were no complications.  Pertinent findings: No evidence of recurrent dysplasia      Assessment and Plan     1. Vocal cord dysplasia  -No evidence of recurrence    2. Nasal congestion  -Recommend trying nasal cones to help with nighttime congestion      Return in about 6 months (around 10/17/2024).      Pat Nunez DO  4/17/24      Portions of this note were dictated using Dragon. There may be linguistic errors secondary to the use of this program.

## 2024-04-18 ENCOUNTER — HOSPITAL ENCOUNTER (OUTPATIENT)
Dept: MAMMOGRAPHY | Age: 54
Discharge: HOME | End: 2024-04-18
Payer: OTHER GOVERNMENT

## 2024-04-18 VITALS — WEIGHT: 165 LBS | HEIGHT: 68 IN | BODY MASS INDEX: 25.01 KG/M2

## 2024-04-18 DIAGNOSIS — Z12.31 ENCOUNTER FOR SCREENING MAMMOGRAM FOR MALIGNANT NEOPLASM OF BREAST: ICD-10-CM

## 2024-04-18 PROCEDURE — 77067 SCR MAMMO BI INCL CAD: CPT

## 2024-05-15 ENCOUNTER — OFFICE VISIT (OUTPATIENT)
Dept: INTERNAL MEDICINE CLINIC | Age: 54
End: 2024-05-15

## 2024-05-15 VITALS
HEIGHT: 68 IN | RESPIRATION RATE: 12 BRPM | BODY MASS INDEX: 25.16 KG/M2 | DIASTOLIC BLOOD PRESSURE: 80 MMHG | HEART RATE: 70 BPM | WEIGHT: 166 LBS | SYSTOLIC BLOOD PRESSURE: 136 MMHG

## 2024-05-15 DIAGNOSIS — E78.5 HYPERLIPIDEMIA, UNSPECIFIED HYPERLIPIDEMIA TYPE: ICD-10-CM

## 2024-05-15 DIAGNOSIS — R49.0 HOARSENESS OF VOICE: ICD-10-CM

## 2024-05-15 DIAGNOSIS — Z87.891 PERSONAL HISTORY OF TOBACCO USE: ICD-10-CM

## 2024-05-15 DIAGNOSIS — M79.89 SOFT TISSUE MASS: ICD-10-CM

## 2024-05-15 DIAGNOSIS — J38.1 REINKE'S EDEMA OF VOCAL FOLDS: ICD-10-CM

## 2024-05-15 DIAGNOSIS — J44.9 CHRONIC OBSTRUCTIVE PULMONARY DISEASE, UNSPECIFIED COPD TYPE (HCC): Primary | ICD-10-CM

## 2024-05-15 PROCEDURE — G0296 VISIT TO DETERM LDCT ELIG: HCPCS | Performed by: INTERNAL MEDICINE

## 2024-05-15 PROCEDURE — 99214 OFFICE O/P EST MOD 30 MIN: CPT | Performed by: INTERNAL MEDICINE

## 2024-05-15 ASSESSMENT — ENCOUNTER SYMPTOMS
WHEEZING: 0
ABDOMINAL PAIN: 0
VOMITING: 0
NAUSEA: 0
RHINORRHEA: 0
SHORTNESS OF BREATH: 0

## 2024-05-15 ASSESSMENT — PATIENT HEALTH QUESTIONNAIRE - PHQ9
2. FEELING DOWN, DEPRESSED OR HOPELESS: NOT AT ALL
SUM OF ALL RESPONSES TO PHQ QUESTIONS 1-9: 0
SUM OF ALL RESPONSES TO PHQ9 QUESTIONS 1 & 2: 0
1. LITTLE INTEREST OR PLEASURE IN DOING THINGS: NOT AT ALL

## 2024-05-15 NOTE — PROGRESS NOTES
Subjective:      Patient ID: Yadira Olivas is a 53 y.o. female.    HPI     Patient is here for follow up.  She has a history of COPD. She is a smoker. She has not done her PFT. I have ordered it and discussed it with her. She does not want to do it.   She had vocal cord polyps removed. It was pre cancerous. She saw ENT recently and exam was ok.  She denies any chest pain.   She denies any GI issues.    She has a soft tissue mass in her back. It bothers her.         Review of Systems   Constitutional:  Negative for appetite change and fatigue.   HENT:  Negative for postnasal drip and rhinorrhea.    Respiratory:  Negative for shortness of breath and wheezing.    Cardiovascular:  Negative for chest pain and palpitations.   Gastrointestinal:  Negative for abdominal pain, nausea and vomiting.   Musculoskeletal:  Negative for joint swelling.   Skin:  Negative for rash.   Neurological:  Negative for light-headedness.   Psychiatric/Behavioral:  Negative for sleep disturbance.      Past Medical History:   Diagnosis Date    Anxiety     COPD (chronic obstructive pulmonary disease) (HCC)     Hx of blood clots 1999    Bilateral DVTs years ago; No anticoagulants currently    Hyperlipidemia     No medications currently       Past Surgical History:   Procedure Laterality Date    DILATION AND CURETTAGE OF UTERUS      LARYNGOSCOPY N/A 6/10/2022    DIRECT LARYNGOSCOPY MICROSCOPE WITH REMOVAL OF BILATERAL VOCAL CORD LESIONS performed by Sukhdeep Rose MD at [facility] OR    MULTIPLE TOOTH EXTRACTIONS      TUBAL LIGATION         Family History   Problem Relation Age of Onset    Lung Cancer Mother     COPD Father     Diabetes type 2  Sister     Obesity Sister     Diabetes type 2  Brother     No Known Problems Brother     Breast Cancer Maternal Grandmother        Social History     Tobacco Use    Smoking status: Every Day     Current packs/day: 0.50     Average packs/day: 0.5 packs/day for 40.4 years (20.2 ttl pk-yrs)     Types: Cigarettes

## 2024-05-24 ENCOUNTER — HOSPITAL ENCOUNTER (OUTPATIENT)
Dept: CT IMAGING | Age: 54
Discharge: HOME | End: 2024-05-24
Attending: INTERNAL MEDICINE
Payer: OTHER GOVERNMENT

## 2024-05-24 DIAGNOSIS — Z87.891 PERSONAL HISTORY OF TOBACCO USE: ICD-10-CM

## 2024-05-24 PROCEDURE — 71271 CT THORAX LUNG CANCER SCR C-: CPT

## 2024-05-28 DIAGNOSIS — R91.1 PULMONARY NODULE: Primary | ICD-10-CM

## 2024-05-29 ENCOUNTER — TELEPHONE (OUTPATIENT)
Dept: PULMONOLOGY | Age: 54
End: 2024-05-29

## 2024-05-29 NOTE — TELEPHONE ENCOUNTER
NPT R/B Naif, Pulmonary Nodule      Please advise for scheduling          LOW DOSE SCREENING CT OF THE CHEST WITHOUT CONTRAST     5/24/2024 6:56 am     TECHNIQUE:  Low dose lung cancer screening CT of the chest was performed without the  administration of intravenous contrast.  Multiplanar reformatted images are  provided for review.  Automated exposure control, iterative reconstruction,  and/or weight based adjustment of the mA/kV was utilized to reduce the  radiation dose to as low as reasonably achievable.     COMPARISON:  01/28/2023     HISTORY:  ORDERING SYSTEM PROVIDED HISTORY: Personal history of tobacco use  TECHNOLOGIST PROVIDED HISTORY:  Age: Patient is 53 y.o.     Smoking History:   Tobacco Use  Smoking status: Every Day  Packs/day: 0.50  Years: 0.5 packs/day for 40.4 years (20.2 ttl pk-yrs)  Types: Cigarettes  Start date: 1984  Smokeless tobacco: Never        Date of last lung cancer screening: No previous lung cancer screening exam  Is there documentation of shared decision making?->Yes  Is this a low dose CT or a routine CT?->Low Dose CT  Is this the first (baseline) CT or an annual exam?->Baseline  Does the patient show any signs or symptoms of lung cancer?->No  Smoking Status?->Every Day  Pack Years->20  CTDlvol (mGy)->1.11  DLP (mGy*cm)->42.1  Reconstructed image width (mm)->2.5     FINDINGS:  Mediastinum: Coronary artery calcifications are a marker of atherosclerosis.  There are no enlarged thoracic lymph nodes.     Lungs/pleura: The tracheobronchial tree is patent.  There is no pneumothorax  or pleural effusion.  Mild emphysema involves the bilateral upper lungs with  bibasilar atelectasis.     No change in the 2 mm solid left upper lobe pulmonary nodule.  There is a new  3 mm solid left lower lobe pulmonary nodule.     Upper Abdomen: A small hiatal hernia is noted.     Soft Tissues/Bones: Degenerative changes involve the thoracic spine.     IMPRESSION:  1. Solid subcentimeter left pulmonary

## 2024-05-29 NOTE — TELEPHONE ENCOUNTER
Called [phone]  Spoke w/ pt and she was unable to schedule. Pt states she will call back when she gets home.

## 2024-06-05 ENCOUNTER — TELEPHONE (OUTPATIENT)
Dept: INTERNAL MEDICINE CLINIC | Age: 54
End: 2024-06-05
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
## 2024-06-05 DIAGNOSIS — M25.511 RIGHT SHOULDER PAIN, UNSPECIFIED CHRONICITY: ICD-10-CM

## 2024-06-05 DIAGNOSIS — M25.552 LEFT HIP PAIN: ICD-10-CM

## 2024-06-05 NOTE — TELEPHONE ENCOUNTER
Called patient. She states she tripped the other day and has transportation issues and would like to hold of on scheduling at the moment.  Can call patient in 2-4 weeks to see if she is ready to schedule

## 2024-06-05 NOTE — TELEPHONE ENCOUNTER
----- Message from Ovi Disla MD sent at 6/5/2024  4:19 PM EDT -----  Contact: [phone]  Xray left hip  Xray right shoulder and collar bone  ----- Message -----  From: Ayesha Hewitt MA  Sent: 6/5/2024   8:36 AM EDT  To: Ovi Disla MD    Patient called and states that she took a fall yesterday onto her L hip and whole side. She was ok immediately after, but this morning she states that her L hip and R shoulder/ collar bone are really hurting. There is no bruising, but the hip is a little swollen. She has been taking naproxen to help with the pain. She wants to know if you can order some xrays to make sure that there is no serious damage? Please advise.       Giovanni VALDEZ

## 2024-06-06 ENCOUNTER — HOSPITAL ENCOUNTER (OUTPATIENT)
Age: 54
Discharge: HOME | End: 2024-06-06
Payer: OTHER GOVERNMENT

## 2024-06-06 ENCOUNTER — HOSPITAL ENCOUNTER (OUTPATIENT)
Dept: GENERAL RADIOLOGY | Age: 54
Discharge: HOME | End: 2024-06-06
Payer: OTHER GOVERNMENT

## 2024-06-06 DIAGNOSIS — M25.511 RIGHT SHOULDER PAIN, UNSPECIFIED CHRONICITY: ICD-10-CM

## 2024-06-06 DIAGNOSIS — M25.552 LEFT HIP PAIN: ICD-10-CM
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
## 2024-06-06 PROCEDURE — 73502 X-RAY EXAM HIP UNI 2-3 VIEWS: CPT

## 2024-06-06 PROCEDURE — 73000 X-RAY EXAM OF COLLAR BONE: CPT

## 2024-10-18 ENCOUNTER — OFFICE VISIT (OUTPATIENT)
Dept: PULMONOLOGY | Age: 54
End: 2024-10-18
Payer: OTHER GOVERNMENT

## 2024-10-18 VITALS
OXYGEN SATURATION: 98 % | HEART RATE: 66 BPM | BODY MASS INDEX: 25.13 KG/M2 | RESPIRATION RATE: 17 BRPM | WEIGHT: 165.8 LBS | DIASTOLIC BLOOD PRESSURE: 70 MMHG | HEIGHT: 68 IN | SYSTOLIC BLOOD PRESSURE: 138 MMHG

## 2024-10-18 DIAGNOSIS — R91.8 LUNG NODULES: Primary | ICD-10-CM

## 2024-10-18 PROCEDURE — 99204 OFFICE O/P NEW MOD 45 MIN: CPT | Performed by: INTERNAL MEDICINE

## 2024-10-18 RX ORDER — AZITHROMYCIN 250 MG/1
TABLET, FILM COATED ORAL
Qty: 6 TABLET | Refills: 0 | OUTPATIENT
Start: 2024-10-18 | End: 2024-10-28

## 2024-10-18 NOTE — PROGRESS NOTES
P  [facility]                                               Pulmonary Clinic Consult     I had the pleasure of seeing  Yadira Olivas     Chief Complaint   Patient presents with    New Patient     Pulmonary Nodule       HISTORY OF PRESENT ILLNESS:    Yadira Olivas is a 54 y.o. 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     The Patient comes in with SOB that has been going on  the last few days as she has congestion  Associated with cough, She  states that it get worse with exercise or walking long distance and he can walk 1 block  And go 1-2 flight of stairs before get short winded    She use albuterol as needed.also has duoneb   No history of lung disease in the past   Declined sleep study             ALLERGIES:    Allergies   Allergen Reactions    Ciprofloxacin     Levaquin [Levofloxacin]     Montelukast Itching    Oxycodone Swelling     Also rash    Pcn [Penicillins]     Prednisone Hives    Vicodin [Hydrocodone-Acetaminophen] Swelling     Also rash    Zyrtec [Cetirizine] Itching    Keflex [Cephalexin] Rash       PAST MEDICAL HISTORY:       Diagnosis Date    Anxiety     COPD (chronic obstructive pulmonary disease) (HCC)     Hx of blood clots 1999    Bilateral DVTs years ago; No anticoagulants currently    Hyperlipidemia     No medications currently       MEDICATIONS:   Current Outpatient Medications   Medication Sig Dispense Refill    naproxen (NAPROSYN) 250 MG tablet Take 1 tablet by mouth 2 times daily (with meals) 20 tablet 1    albuterol sulfate HFA (PROAIR HFA) 108 (90 Base) MCG/ACT inhaler Inhale 2 puffs into the lungs every 6 hours as needed for Wheezing 3 each 0    ipratropium 0.5 mg-albuterol 2.5 mg (DUONEB) 0.5-2.5 (3)

## 2024-10-23 DIAGNOSIS — R91.8 LUNG NODULES: Primary | ICD-10-CM

## 2024-10-23 RX ORDER — AZITHROMYCIN 250 MG/1
TABLET, FILM COATED ORAL
Qty: 7 TABLET | Refills: 0 | Status: SHIPPED | OUTPATIENT
Start: 2024-10-23 | End: 2024-11-02

## 2024-10-23 NOTE — TELEPHONE ENCOUNTER
Please send in patient azithromycin 250 mg to [facility] in [place].  Medication has orders of 250 for 7 days. Patient called in stating you would send this medication     IMPRESSION:    1-possible copd  2-lung nodules ,one new in yudy   3-lung nodules  4-vocal cord lesion removed as above                 PLAN:      Will proceed with following work up  Will need CT scan r/o nodule/cancer  Will get follow up PFT  Will get 6 m walk test  Has some congestion will give azithromycin 250 po daily X 7 days     Declined vaccine and sleep study,understand risks    Has chicken in house  Check histo  I spent 4-6 minutes counseling patient regarding smoking and the risk of Lung cancer and COPD and respiratory failure   He was referred to [facility] ,given instuction

## 2024-11-01 ENCOUNTER — APPOINTMENT (OUTPATIENT)
Dept: PULMONOLOGY | Age: 54
End: 2024-11-01
Payer: OTHER GOVERNMENT

## 2024-11-01 ENCOUNTER — HOSPITAL ENCOUNTER (OUTPATIENT)
Dept: CT IMAGING | Age: 54
Discharge: HOME | End: 2024-11-01
Payer: OTHER GOVERNMENT

## 2024-11-01 ENCOUNTER — HOSPITAL ENCOUNTER (OUTPATIENT)
Age: 54
Discharge: HOME | End: 2024-11-01
Payer: OTHER GOVERNMENT

## 2024-11-01 DIAGNOSIS — R91.8 LUNG NODULES: ICD-10-CM

## 2024-11-01 PROCEDURE — 36415 COLL VENOUS BLD VENIPUNCTURE: CPT

## 2024-11-01 PROCEDURE — 87385 HISTOPLASMA CAPSUL AG IA: CPT

## 2024-11-01 PROCEDURE — 86698 HISTOPLASMA ANTIBODY: CPT

## 2024-11-01 PROCEDURE — 71250 CT THORAX DX C-: CPT

## 2024-11-04 LAB
H CAPSUL AG UR IA-ACNC: NOT DETECTED NG/ML
H CAPSUL AG UR QL IA: NOT DETECTED

## 2024-11-06 LAB — H CAPSUL AB TITR SER ID: NOT DETECTED {TITER}

## 2025-02-14 ENCOUNTER — TELEPHONE (OUTPATIENT)
Dept: INTERNAL MEDICINE CLINIC | Age: 55
End: 2025-02-14

## 2025-02-14 RX ORDER — CLINDAMYCIN HYDROCHLORIDE 300 MG/1
300 CAPSULE ORAL 3 TIMES DAILY
Qty: 21 CAPSULE | Refills: 0 | Status: SHIPPED | OUTPATIENT
Start: 2025-02-14 | End: 2025-02-21

## 2025-02-14 RX ORDER — CHLORHEXIDINE GLUCONATE ORAL RINSE 1.2 MG/ML
15 SOLUTION DENTAL 2 TIMES DAILY
Qty: 420 ML | Refills: 0 | Status: SHIPPED | OUTPATIENT
Start: 2025-02-14 | End: 2025-02-28

## 2025-02-14 NOTE — TELEPHONE ENCOUNTER
----- Message from JEISON Richards CNP sent at 2/14/2025 11:39 AM EST -----  Contact: Patient [phone]  Also should take probiotic while on antibiotic  ----- Message -----  From: Shelby Dubnar  Sent: 2/14/2025  10:33 AM EST  To: JEISON Richards CNP    Patient requesting antibiotic for gum swollen and squishy front teeth.  Patient states she a periodontal disease, and can't take anything that has Vicodin in it.  Please advise           [facility]/PHARMACY #[id number] - [place], [place] - [address] - P [phone] - F [phone]

## 2025-02-14 NOTE — TELEPHONE ENCOUNTER
----- Message from JEISON Richards CNP sent at 2/14/2025 11:38 AM EST -----  Contact: Patient [phone]  Sent, but should see dentist  ----- Message -----  From: Shelby Dunbar  Sent: 2/14/2025  10:33 AM EST  To: JEISON Richards CNP    Patient requesting antibiotic for gum swollen and squishy front teeth.  Patient states she a periodontal disease, and can't take anything that has Vicodin in it.  Please advise           [facility]/PHARMACY #[id number] - [place], [place] - [address] - P [phone] - F [phone]

## 2025-02-26 ENCOUNTER — TELEPHONE (OUTPATIENT)
Dept: INTERNAL MEDICINE CLINIC | Age: 55
End: 2025-02-26

## 2025-02-26 RX ORDER — PROMETHAZINE HYDROCHLORIDE 25 MG/1
25 TABLET ORAL 3 TIMES DAILY
Qty: 15 TABLET | Refills: 0 | Status: SHIPPED | OUTPATIENT
Start: 2025-02-26

## 2025-02-26 NOTE — TELEPHONE ENCOUNTER
----- Message from Aristeo SEGURA sent at 2/26/2025  2:25 PM EST -----  Contact: HEMANT [phone]  Patient states starting middle of the night 2/26, she began experiencing the following; vomiting, nausea, diarrhea, low grade fever, and body aches. Pt has been pushing fluids, but has very little appetite. Not currently taking any over the counter medication. Requesting a script to the below pharmacy. Please advise     [facility]/pharmacy #[id number] - [place] - [address] [facility] [address] - P [phone] - F [phone]  [address] [place] [address]  Phone: [phone]  Fax: [phone]

## 2025-03-27 ENCOUNTER — TELEPHONE (OUTPATIENT)
Dept: INTERNAL MEDICINE CLINIC | Age: 55
End: 2025-03-27

## 2025-03-27 NOTE — TELEPHONE ENCOUNTER
----- Message from NASRA HOLCOMB MA sent at 3/27/2025  8:51 AM EDT -----  Dr. Bae, the [facility] paperwork for patient has came back with questionable problems that need to be discussed with you before proceeding. Please see Myself or Nichole before leaving. Thanks!

## 2025-03-27 NOTE — TELEPHONE ENCOUNTER
Verbal from Dr. Disla to call [place] or patient to find out what is needed/accepted for this FMLA. If office visit is needed we will not be able to do the FMLA, as patient didn't come in to the office. Patient did contact the office with a sick call on 02/26 and was prescribed medication.     [employer] representative Yareli, confirmed patient's dates needed to be 03/01-03/04. [employer] rogersay 'd for the original dates on document to be changed to 03/04 with an initial. I did confirm this would be done by our billing department and they confirmed it was fine.     The telephone encounter for the sick call is an accepted form to prove medical attention as required for the FMLA.    Nichole faxed updated FMLA paperwork, along with the telephone encounter between the Dr. Disla and the patient.

## 2025-04-11 ENCOUNTER — OFFICE VISIT (OUTPATIENT)
Dept: INTERNAL MEDICINE CLINIC | Age: 55
End: 2025-04-11

## 2025-04-11 VITALS
HEIGHT: 68 IN | RESPIRATION RATE: 14 BRPM | WEIGHT: 169 LBS | SYSTOLIC BLOOD PRESSURE: 126 MMHG | HEART RATE: 82 BPM | DIASTOLIC BLOOD PRESSURE: 80 MMHG | OXYGEN SATURATION: 95 % | BODY MASS INDEX: 25.61 KG/M2

## 2025-04-11 DIAGNOSIS — J40 BRONCHITIS: Primary | ICD-10-CM

## 2025-04-11 PROCEDURE — 99213 OFFICE O/P EST LOW 20 MIN: CPT | Performed by: NURSE PRACTITIONER

## 2025-04-11 RX ORDER — AZITHROMYCIN 250 MG/1
TABLET, FILM COATED ORAL
Qty: 6 TABLET | Refills: 0 | Status: SHIPPED | OUTPATIENT
Start: 2025-04-11 | End: 2025-04-21

## 2025-04-11 NOTE — PROGRESS NOTES
Chief Complaint:   Yadira Olivas is a 54 y.o. female who presents for   Chief Complaint   Patient presents with    Cough       HPI    Presents with c/o bronchitis.  Started with allergies, URI. It has gotten worse.   Cough productive of very thick sputum.   In the mornings, she is kind of dizzy until she coughs some of it out.   Got sick at the beginning of March. Had nausea, vomiting, diarrhea. Then turned into upper respiratory symptoms.     Review of Systems  Review of Systems   Constitutional:  Positive for fatigue. Negative for fever.   HENT:  Positive for congestion.    Respiratory:  Positive for cough, shortness of breath and wheezing.    Neurological:  Positive for dizziness.         Allergies  Ciprofloxacin, Levaquin [levofloxacin], Montelukast, Oxycodone, Pcn [penicillins], Prednisone, Vicodin [hydrocodone-acetaminophen], Zyrtec [cetirizine], and Keflex [cephalexin]      Vitals  /80 (BP Site: Right Upper Arm, Patient Position: Sitting)   Pulse 82   Resp 14   Ht 1.727 m (5' 8\")   Wt 76.7 kg (169 lb)   SpO2 95%   BMI 25.70 kg/m²     Current Medications  Current Outpatient Medications   Medication Sig Dispense Refill    promethazine (PHENERGAN) 25 MG tablet Take 1 tablet by mouth in the morning, at noon, and at bedtime 15 tablet 0    naproxen (NAPROSYN) 250 MG tablet Take 1 tablet by mouth 2 times daily (with meals) 20 tablet 1    atorvastatin (LIPITOR) 20 MG tablet Take 1 tablet by mouth daily (Patient not taking: Reported on 10/18/2024) 90 tablet 0    albuterol sulfate HFA (PROAIR HFA) 108 (90 Base) MCG/ACT inhaler Inhale 2 puffs into the lungs every 6 hours as needed for Wheezing 3 each 0    ipratropium 0.5 mg-albuterol 2.5 mg (DUONEB) 0.5-2.5 (3) MG/3ML SOLN nebulizer solution Inhale 3 mLs into the lungs every 4 hours as needed for Shortness of Breath 1620 mL 0    aspirin 81 MG EC tablet Take 1 tablet by mouth daily      fluticasone (FLONASE) 50 MCG/ACT nasal spray 1 spray by Each Nostril

## 2025-06-13 ENCOUNTER — OFFICE VISIT (OUTPATIENT)
Dept: PULMONOLOGY | Age: 55
End: 2025-06-13
Payer: OTHER GOVERNMENT

## 2025-06-13 VITALS
OXYGEN SATURATION: 95 % | BODY MASS INDEX: 27.04 KG/M2 | SYSTOLIC BLOOD PRESSURE: 122 MMHG | DIASTOLIC BLOOD PRESSURE: 60 MMHG | RESPIRATION RATE: 17 BRPM | HEIGHT: 68 IN | WEIGHT: 178.4 LBS | HEART RATE: 78 BPM

## 2025-06-13 DIAGNOSIS — R06.02 SOB (SHORTNESS OF BREATH): Primary | ICD-10-CM

## 2025-06-13 DIAGNOSIS — Z87.891 PERSONAL HISTORY OF TOBACCO USE: ICD-10-CM

## 2025-06-13 PROCEDURE — 99214 OFFICE O/P EST MOD 30 MIN: CPT | Performed by: INTERNAL MEDICINE

## 2025-06-13 PROCEDURE — G0296 VISIT TO DETERM LDCT ELIG: HCPCS | Performed by: INTERNAL MEDICINE

## 2025-06-13 RX ORDER — AZITHROMYCIN 250 MG/1
250 TABLET, FILM COATED ORAL DAILY
Qty: 7 TABLET | Refills: 0 | Status: SHIPPED | OUTPATIENT
Start: 2025-06-13 | End: 2025-06-20

## 2025-06-13 RX ORDER — ALBUTEROL SULFATE 90 UG/1
2 INHALANT RESPIRATORY (INHALATION) EVERY 4 HOURS PRN
Qty: 18 G | Refills: 6 | Status: SHIPPED | OUTPATIENT
Start: 2025-06-13

## 2025-06-13 RX ORDER — BUDESONIDE AND FORMOTEROL FUMARATE DIHYDRATE 160; 4.5 UG/1; UG/1
2 AEROSOL RESPIRATORY (INHALATION) 2 TIMES DAILY
Qty: 1 EACH | Refills: 2 | Status: SHIPPED | OUTPATIENT
Start: 2025-06-13

## 2025-06-13 NOTE — PROGRESS NOTES
REMOVAL OF BILATERAL VOCAL CORD LESIONS performed by Sukhdeep Rose MD at [facility] OR   • MULTIPLE TOOTH EXTRACTIONS     • TUBAL LIGATION         FAMILY HISTORY:   Lung cancer:Mother smoker with lung cancer   DVT or PE no    REVIEW OF SYSTEMS:  Constitutional: General health is good . There has been no weight changes. No fevers, fatigue or weakness.   Head: Patient denies any history of trauma, convulsive disorder or syncope.    Skin:  Patient denies history of changes in pigmentation, eruptions or pruritus.  No easy bruising or bleeding.  EENT: no nasal congestion   Cardiovascular ,No chest pain ,No edema ,  Respiration:SOB:  ,LOPEZ :mild  Gastrointestinal:No GI bleed ,no melena  ,no hematemesis    Musculoskeletal: no joint pain ,no swelling  Neurological:no , syncope.  Denies twitching, convulsions, loss of consciousness or memory.     Endocrine:  . No history of goiter, exophthalmos or dryness of skin.  The patient has no history of diabetes.    Hematopoietic:  No history of bleeding disorders or easy bruising.  Rheumatic:  No connective tissue disease history or polyarthritis/inflammatory joint disease.      PHYSICAL EXAMINATION:  Vitals:    06/13/25 1329   BP: 122/60   Pulse: 78   Resp: 17   SpO2: 95%     Constitutional: This is a well developed, well nourished 54 y.o. year old female who is alert, oriented, cooperative and in no apparent distress.  Head was normocephalic and atraumatic.    EENT: Mallampati class :  Extraocular muscles intact.   External canals are patent and no discharge was appreciated.  Septum was midline,   mucosa was without erythema, exudates or cobblestoning.  No thrush was noted.      Neck: Supple without thyromegaly. No elevated JVP. Trachea was midline. No carotid bruits were auscultated.    Respiratory: Rhonchi    Cardiovascular: Regular without murmur, clicks, gallops or rubs.  There is no left or right ventricular heave.    Pulses:  Carotid, radial and femoral pulses were equally

## 2025-06-13 NOTE — PATIENT INSTRUCTIONS
follow-up, he or she will help you understand what to do next.  After a lung cancer screening, you can go back to your usual activities right away.  A lung cancer screening test can't tell if you have lung cancer. If your results are positive, your doctor can't tell whether an abnormal finding is a harmless nodule, cancer, or something else without doing more tests.  What can you do to help prevent lung cancer?  Some lung cancers can't be prevented. But if you smoke, quitting smoking is the best step you can take to prevent lung cancer. If you want to quit, your doctor can recommend medicines or other ways to help.  Follow-up care is a key part of your treatment and safety. Be sure to make and go to all appointments, and call your doctor if you are having problems. It's also a good idea to know your test results and keep a list of the medicines you take.  Where can you learn more?  Go to https://www.enavu.net/patientEd and enter Q940 to learn more about \"Learning About Lung Cancer Screening.\"  Current as of: October 25, 2024  Content Version: 14.5  © 0462-5878 [employer].   Care instructions adapted under license by [employer]. If you have questions about a medical condition or this instruction, always ask your healthcare professional. [employer], disclaims any warranty or liability for your use of this information.

## 2025-06-17 ENCOUNTER — OFFICE VISIT (OUTPATIENT)
Dept: INTERNAL MEDICINE CLINIC | Age: 55
End: 2025-06-17

## 2025-06-17 VITALS
WEIGHT: 175 LBS | HEART RATE: 70 BPM | RESPIRATION RATE: 12 BRPM | HEIGHT: 68 IN | BODY MASS INDEX: 26.52 KG/M2 | SYSTOLIC BLOOD PRESSURE: 130 MMHG | DIASTOLIC BLOOD PRESSURE: 70 MMHG

## 2025-06-17 DIAGNOSIS — E78.5 HYPERLIPIDEMIA, UNSPECIFIED HYPERLIPIDEMIA TYPE: ICD-10-CM

## 2025-06-17 DIAGNOSIS — Z00.00 ENCOUNTER FOR WELL ADULT EXAM WITHOUT ABNORMAL FINDINGS: ICD-10-CM

## 2025-06-17 DIAGNOSIS — Z00.00 ROUTINE GENERAL MEDICAL EXAMINATION AT A HEALTH CARE FACILITY: Primary | ICD-10-CM

## 2025-06-17 DIAGNOSIS — Z12.11 COLON CANCER SCREENING: ICD-10-CM

## 2025-06-17 DIAGNOSIS — J38.1 REINKE'S EDEMA OF VOCAL FOLDS: ICD-10-CM

## 2025-06-17 DIAGNOSIS — J44.9 CHRONIC OBSTRUCTIVE PULMONARY DISEASE, UNSPECIFIED COPD TYPE (HCC): ICD-10-CM

## 2025-06-17 PROCEDURE — 82274 ASSAY TEST FOR BLOOD FECAL: CPT | Performed by: INTERNAL MEDICINE

## 2025-06-17 PROCEDURE — 99396 PREV VISIT EST AGE 40-64: CPT | Performed by: INTERNAL MEDICINE

## 2025-06-17 RX ORDER — IPRATROPIUM BROMIDE AND ALBUTEROL SULFATE 2.5; .5 MG/3ML; MG/3ML
1 SOLUTION RESPIRATORY (INHALATION) EVERY 4 HOURS PRN
Qty: 1620 ML | Refills: 3 | Status: SHIPPED | OUTPATIENT
Start: 2025-06-17 | End: 2026-06-17

## 2025-06-17 RX ORDER — ALBUTEROL SULFATE 90 UG/1
2 INHALANT RESPIRATORY (INHALATION) EVERY 4 HOURS PRN
Qty: 18 G | Refills: 3 | Status: SHIPPED | OUTPATIENT
Start: 2025-06-17

## 2025-06-17 RX ORDER — DESLORATADINE 5 MG/1
5 TABLET ORAL DAILY
Qty: 90 TABLET | Refills: 0 | Status: SHIPPED | OUTPATIENT
Start: 2025-06-17

## 2025-06-17 ASSESSMENT — ENCOUNTER SYMPTOMS
SHORTNESS OF BREATH: 0
ABDOMINAL PAIN: 0
VOMITING: 0
RHINORRHEA: 0
WHEEZING: 0
NAUSEA: 0

## 2025-06-17 ASSESSMENT — PATIENT HEALTH QUESTIONNAIRE - PHQ9
1. LITTLE INTEREST OR PLEASURE IN DOING THINGS: NOT AT ALL
SUM OF ALL RESPONSES TO PHQ QUESTIONS 1-9: 0
2. FEELING DOWN, DEPRESSED OR HOPELESS: NOT AT ALL
SUM OF ALL RESPONSES TO PHQ QUESTIONS 1-9: 0

## 2025-06-17 NOTE — PROGRESS NOTES
Subjective:      Patient ID: Yadira Olivas is a 54 y.o. female.    HPI     Patient is here for annual exam.  She has a history of COPD. She is a smoker. She has not done her PFT. I have ordered it and discussed it with her. She does not want to do it.   She had vocal cord polyps removed. It was pre cancerous. She saw ENT recently and exam was ok.  She denies any chest pain.   She denies any GI issues.            Review of Systems   Constitutional:  Negative for appetite change and fatigue.   HENT:  Negative for postnasal drip and rhinorrhea.    Respiratory:  Negative for shortness of breath and wheezing.    Cardiovascular:  Negative for chest pain and palpitations.   Gastrointestinal:  Negative for abdominal pain, nausea and vomiting.   Musculoskeletal:  Negative for joint swelling.   Skin:  Negative for rash.   Neurological:  Negative for light-headedness.   Psychiatric/Behavioral:  Negative for sleep disturbance.      Past Medical History:   Diagnosis Date    Anxiety     COPD (chronic obstructive pulmonary disease) (HCC)     Hx of blood clots 1999    Bilateral DVTs years ago; No anticoagulants currently    Hyperlipidemia     No medications currently       Past Surgical History:   Procedure Laterality Date    DILATION AND CURETTAGE OF UTERUS      LARYNGOSCOPY N/A 6/10/2022    DIRECT LARYNGOSCOPY MICROSCOPE WITH REMOVAL OF BILATERAL VOCAL CORD LESIONS [facility]    MULTIPLE TOOTH EXTRACTIONS      TUBAL LIGATION         Family History   Problem Relation Age of Onset    Lung Cancer Mother     COPD Father     Diabetes type 2  Sister     Obesity Sister     Diabetes type 2  Brother     No Known Problems Brother     Breast Cancer Maternal Grandmother        Social History     Tobacco Use    Smoking status: Every Day     Current packs/day: 0.50     Average packs/day: 0.5 packs/day for 41.5 years (20.7 ttl pk-yrs)     Types: Cigarettes     Start date: 1984    Smokeless tobacco: Never

## 2025-06-18 LAB
ALBUMIN SERPL-MCNC: 4.2 G/DL (ref 3.4–5)
ALBUMIN/GLOB SERPL: 1.6 {RATIO} (ref 1.1–2.2)
ALP SERPL-CCNC: 124 U/L (ref 40–129)
ALT SERPL-CCNC: 21 U/L (ref 10–40)
ANION GAP SERPL CALCULATED.3IONS-SCNC: 11 MMOL/L (ref 3–16)
AST SERPL-CCNC: 20 U/L (ref 15–37)
BASOPHILS # BLD: 0.1 K/UL (ref 0–0.2)
BASOPHILS NFR BLD: 1.2 %
BILIRUB SERPL-MCNC: 0.3 MG/DL (ref 0–1)
BUN SERPL-MCNC: 16 MG/DL (ref 7–20)
CALCIUM SERPL-MCNC: 9.6 MG/DL (ref 8.3–10.6)
CHLORIDE SERPL-SCNC: 103 MMOL/L (ref 99–110)
CHOLEST SERPL-MCNC: 246 MG/DL (ref 0–199)
CO2 SERPL-SCNC: 25 MMOL/L (ref 21–32)
CREAT SERPL-MCNC: 0.5 MG/DL (ref 0.6–1.1)
DEPRECATED RDW RBC AUTO: 13.1 % (ref 12.4–15.4)
EOSINOPHIL # BLD: 0.3 K/UL (ref 0–0.6)
EOSINOPHIL NFR BLD: 2.8 %
GFR SERPLBLD CREATININE-BSD FMLA CKD-EPI: >90 ML/MIN/{1.73_M2}
GLUCOSE SERPL-MCNC: 89 MG/DL (ref 70–99)
HCT VFR BLD AUTO: 44.2 % (ref 36–48)
HDLC SERPL-MCNC: 54 MG/DL (ref 40–60)
HGB BLD-MCNC: 15.2 G/DL (ref 12–16)
LDLC SERPL CALC-MCNC: 164 MG/DL
LYMPHOCYTES # BLD: 1.8 K/UL (ref 1–5.1)
LYMPHOCYTES NFR BLD: 19.4 %
MCH RBC QN AUTO: 32.2 PG (ref 26–34)
MCHC RBC AUTO-ENTMCNC: 34.4 G/DL (ref 31–36)
MCV RBC AUTO: 93.4 FL (ref 80–100)
MONOCYTES # BLD: 0.9 K/UL (ref 0–1.3)
MONOCYTES NFR BLD: 9.7 %
NEUTROPHILS # BLD: 6.1 K/UL (ref 1.7–7.7)
NEUTROPHILS NFR BLD: 66.9 %
PLATELET # BLD AUTO: 210 K/UL (ref 135–450)
PMV BLD AUTO: 9.6 FL (ref 5–10.5)
POTASSIUM SERPL-SCNC: 4.2 MMOL/L (ref 3.5–5.1)
PROT SERPL-MCNC: 6.9 G/DL (ref 6.4–8.2)
RBC # BLD AUTO: 4.73 M/UL (ref 4–5.2)
SODIUM SERPL-SCNC: 139 MMOL/L (ref 136–145)
TRIGL SERPL-MCNC: 140 MG/DL (ref 0–150)
TSH SERPL DL<=0.005 MIU/L-ACNC: 2.46 UIU/ML (ref 0.27–4.2)
URATE SERPL-MCNC: 2.8 MG/DL (ref 2.6–6)
VLDLC SERPL CALC-MCNC: 28 MG/DL
WBC # BLD AUTO: 9.2 K/UL (ref 4–11)

## 2025-06-27 ENCOUNTER — RESULTS FOLLOW-UP (OUTPATIENT)
Dept: INTERNAL MEDICINE CLINIC | Age: 55
End: 2025-06-27

## 2025-06-30 ENCOUNTER — TELEPHONE (OUTPATIENT)
Dept: INTERNAL MEDICINE CLINIC | Age: 55
End: 2025-06-30

## 2025-06-30 NOTE — TELEPHONE ENCOUNTER
[facility] states the problem was a possible allergy due to patient being allergic to Zyrtec.     Verbal from Dr. Disla that patient can take clarinex.    Verbal given to [facility] and they will now fill the medication. Attempted to contact patient unable to leave voicemail.

## 2025-06-30 NOTE — TELEPHONE ENCOUNTER
----- Message from Nisha DSOUZA sent at 6/30/2025  8:09 AM EDT -----  Contact: [phone]  desloratadine (CLARINEX) 5 MG tablet  ----- Message -----  From: Isabel Zhao  Sent: 6/27/2025  12:11 PM EDT  To: Nisha Valencia    Which script?  ----- Message -----  From: Nisha Valencia  Sent: 6/27/2025  11:37 AM EDT  To: Glory Andujar MA    Pt states pharmacy canceled below script due to needing more information and had no response from our office. Pt was given # [phone] for us to call and ask what was needed. Please advise     Pharmacy    [facility] - [place], [place] - [address] [place] [address] - P [phone] - F [phone] [address] [place] [address]  Phone: [phone]  Fax: [phone]

## 2025-07-08 ENCOUNTER — HOSPITAL ENCOUNTER (OUTPATIENT)
Dept: PULMONOLOGY | Age: 55
Discharge: HOME | End: 2025-07-08
Payer: OTHER GOVERNMENT

## 2025-07-08 DIAGNOSIS — R06.02 SOB (SHORTNESS OF BREATH): ICD-10-CM

## 2025-07-08 LAB
DLCO %PRED: 60 %
DLCO PRED: NORMAL
DLCO/VA %PRED: NORMAL
DLCO/VA PRED: NORMAL
DLCO/VA: NORMAL
DLCO: NORMAL
EXPIRATORY TIME-POST: NORMAL
EXPIRATORY TIME: NORMAL
FEF 25-75 %CHNG: NORMAL
FEF 25-75 POST %PRED: NORMAL
FEF 25-75% %PRED-PRE: NORMAL
FEF 25-75% PRED: NORMAL
FEF 25-75-POST: NORMAL
FEF 25-75-PRE: NORMAL
FEV1 %PRED-POST: 77 %
FEV1 %PRED-PRE: 71 %
FEV1 PRED: NORMAL
FEV1-POST: NORMAL
FEV1-PRE: NORMAL
FEV1/FVC %PRED-POST: NORMAL
FEV1/FVC %PRED-PRE: NORMAL
FEV1/FVC PRED: NORMAL
FEV1/FVC-POST: 84 %
FEV1/FVC-PRE: 86 %
FVC %PRED-POST: NORMAL
FVC %PRED-PRE: NORMAL
FVC PRED: NORMAL
FVC-POST: NORMAL
FVC-PRE: NORMAL
GAW %PRED: NORMAL
GAW PRED: NORMAL
GAW: NORMAL
IC PRE %PRED: NORMAL
IC PRED: NORMAL
IC: NORMAL
MEP: NORMAL
MIP: NORMAL
MVV %PRED-PRE: NORMAL
MVV PRED: NORMAL
MVV-PRE: NORMAL
PEF %PRED-POST: NORMAL
PEF %PRED-PRE: NORMAL
PEF PRED: NORMAL
PEF%CHNG: NORMAL
PEF-POST: NORMAL
PEF-PRE: NORMAL
RAW %PRED: NORMAL
RAW PRED: NORMAL
RAW: NORMAL
RV PRE %PRED: NORMAL
RV PRED: NORMAL
RV: NORMAL
SVC %PRED: NORMAL
SVC PRED: NORMAL
SVC: NORMAL
TLC PRE %PRED: 95 %
TLC PRED: NORMAL
TLC: NORMAL
VA %PRED: NORMAL
VA PRED: NORMAL
VA: NORMAL
VTG %PRED: NORMAL
VTG PRED: NORMAL
VTG: NORMAL

## 2025-07-08 PROCEDURE — 94729 DIFFUSING CAPACITY: CPT

## 2025-07-08 PROCEDURE — 94640 AIRWAY INHALATION TREATMENT: CPT

## 2025-07-08 PROCEDURE — 6370000000 HC RX 637 (ALT 250 FOR IP): Performed by: INTERNAL MEDICINE

## 2025-07-08 PROCEDURE — 94060 EVALUATION OF WHEEZING: CPT

## 2025-07-08 PROCEDURE — 94726 PLETHYSMOGRAPHY LUNG VOLUMES: CPT

## 2025-07-08 PROCEDURE — 94618 PULMONARY STRESS TESTING: CPT

## 2025-07-08 RX ORDER — ALBUTEROL SULFATE 90 UG/1
4 INHALANT RESPIRATORY (INHALATION) ONCE
Status: COMPLETED | OUTPATIENT
Start: 2025-07-08 | End: 2025-07-08

## 2025-07-08 RX ADMIN — ALBUTEROL SULFATE 4 PUFF: 90 AEROSOL, METERED RESPIRATORY (INHALATION) at 11:01

## 2025-07-08 ASSESSMENT — PULMONARY FUNCTION TESTS
FEV1_PERCENT_PREDICTED_PRE: 71
FEV1/FVC_POST: 84
FEV1/FVC_PRE: 86
FEV1_PERCENT_PREDICTED_POST: 77

## 2025-07-17 ASSESSMENT — PULMONARY FUNCTION TESTS
FEV1/FVC_PRE: 86
FEV1_PERCENT_PREDICTED_POST: 77
FEV1/FVC_POST: 84
FEV1_PERCENT_PREDICTED_PRE: 71

## 2025-07-21 PROBLEM — R06.02 SOB (SHORTNESS OF BREATH): Status: ACTIVE | Noted: 2025-07-21

## (undated) DEVICE — CATHETER SUCT TR FL TIP 14FR W/ O CTRL

## (undated) DEVICE — MINOR SET UP: Brand: MEDLINE INDUSTRIES, INC.

## (undated) DEVICE — GOWN,SIRUS,POLYRNF,BRTHSLV,XL,30/CS: Brand: MEDLINE

## (undated) DEVICE — ANTI-FOG SOLUTION WITH FOAM PAD: Brand: DEVON

## (undated) DEVICE — SUTURE ETHLN SZ 2-0 L18IN NONABSORBABLE BLK L19MM PS-2 PRIM 593H

## (undated) DEVICE — NEEDLE HYPO 25GA L1.5IN BLU POLYPR HUB S STL REG BVL STR

## (undated) DEVICE — TIP SUCT DIA12FR W STYL CTRL VENT DISPOSABLE FRAZ

## (undated) DEVICE — CORD ES L12FT BPLR FRCP

## (undated) DEVICE — GLOVE SURG SZ 6 THK91MIL LTX FREE SYN POLYISOPRENE ANTI

## (undated) DEVICE — OPTIFOAM GENTLE,NON-BORDERED,4X4: Brand: MEDLINE

## (undated) DEVICE — TELFA NON-ADHERENT ABSORBENT DRESSING: Brand: TELFA

## (undated) DEVICE — SYRINGE MED 10ML TRNSLUC BRL PLUNG BLK MRK POLYPR CTRL

## (undated) DEVICE — SHEET,DRAPE,53X77,STERILE: Brand: MEDLINE

## (undated) DEVICE — SURGICAL PROCEDURE PACK IV U-BAR

## (undated) DEVICE — GUARD TEETH AD PR NYL

## (undated) DEVICE — SOLUTION IV IRRIG POUR BRL 0.9% SODIUM CHL 2F7124

## (undated) DEVICE — SPONGE,PEANUT,XRAY,ST,SM,3/8",5/CARD: Brand: MEDLINE INDUSTRIES, INC.

## (undated) DEVICE — CODMAN® SURGICAL PATTIES 1/2" X 1/2" (1.27CM X 1.27CM): Brand: CODMAN®